# Patient Record
Sex: FEMALE | Race: WHITE | Employment: OTHER | URBAN - METROPOLITAN AREA
[De-identification: names, ages, dates, MRNs, and addresses within clinical notes are randomized per-mention and may not be internally consistent; named-entity substitution may affect disease eponyms.]

---

## 2017-01-03 RX ORDER — CITALOPRAM 10 MG/1
TABLET ORAL
Qty: 30 TABLET | Refills: 2 | Status: SHIPPED | OUTPATIENT
Start: 2017-01-03 | End: 2017-01-09 | Stop reason: SDUPTHER

## 2017-01-09 ENCOUNTER — OFFICE VISIT (OUTPATIENT)
Dept: FAMILY MEDICINE CLINIC | Age: 82
End: 2017-01-09

## 2017-01-09 VITALS
WEIGHT: 134 LBS | SYSTOLIC BLOOD PRESSURE: 115 MMHG | RESPIRATION RATE: 16 BRPM | TEMPERATURE: 98.1 F | HEART RATE: 91 BPM | BODY MASS INDEX: 24.51 KG/M2 | DIASTOLIC BLOOD PRESSURE: 70 MMHG

## 2017-01-09 DIAGNOSIS — E78.2 MIXED HYPERLIPIDEMIA: ICD-10-CM

## 2017-01-09 DIAGNOSIS — I10 ESSENTIAL HYPERTENSION: Primary | ICD-10-CM

## 2017-01-09 DIAGNOSIS — F01.518 VASCULAR DEMENTIA WITH BEHAVIOR DISTURBANCE: ICD-10-CM

## 2017-01-09 DIAGNOSIS — I10 ESSENTIAL HYPERTENSION: ICD-10-CM

## 2017-01-09 LAB
A/G RATIO: 1.7 (ref 1.1–2.2)
ALBUMIN SERPL-MCNC: 4.2 G/DL (ref 3.4–5)
ALP BLD-CCNC: 63 U/L (ref 40–129)
ALT SERPL-CCNC: 14 U/L (ref 10–40)
ANION GAP SERPL CALCULATED.3IONS-SCNC: 12 MMOL/L (ref 3–16)
AST SERPL-CCNC: 21 U/L (ref 15–37)
BILIRUB SERPL-MCNC: 0.3 MG/DL (ref 0–1)
BUN BLDV-MCNC: 20 MG/DL (ref 7–20)
CALCIUM SERPL-MCNC: 9.7 MG/DL (ref 8.3–10.6)
CHLORIDE BLD-SCNC: 102 MMOL/L (ref 99–110)
CHOLESTEROL, TOTAL: 229 MG/DL (ref 0–199)
CO2: 27 MMOL/L (ref 21–32)
CREAT SERPL-MCNC: 0.8 MG/DL (ref 0.6–1.2)
GFR AFRICAN AMERICAN: >60
GFR NON-AFRICAN AMERICAN: >60
GLOBULIN: 2.5 G/DL
GLUCOSE BLD-MCNC: 100 MG/DL (ref 70–99)
HDLC SERPL-MCNC: 62 MG/DL (ref 40–60)
LDL CHOLESTEROL CALCULATED: 141 MG/DL
POTASSIUM SERPL-SCNC: 4.7 MMOL/L (ref 3.5–5.1)
SODIUM BLD-SCNC: 141 MMOL/L (ref 136–145)
TOTAL PROTEIN: 6.7 G/DL (ref 6.4–8.2)
TRIGL SERPL-MCNC: 129 MG/DL (ref 0–150)
VLDLC SERPL CALC-MCNC: 26 MG/DL

## 2017-01-09 PROCEDURE — 99214 OFFICE O/P EST MOD 30 MIN: CPT | Performed by: FAMILY MEDICINE

## 2017-01-09 RX ORDER — ATENOLOL 25 MG/1
25 TABLET ORAL DAILY
Qty: 30 TABLET | Refills: 6 | Status: SHIPPED | OUTPATIENT
Start: 2017-01-09 | End: 2017-08-17 | Stop reason: SDUPTHER

## 2017-01-09 RX ORDER — CITALOPRAM 10 MG/1
TABLET ORAL
Qty: 30 TABLET | Refills: 5 | Status: SHIPPED | OUTPATIENT
Start: 2017-01-09 | End: 2017-07-03 | Stop reason: SDUPTHER

## 2017-01-09 RX ORDER — SIMVASTATIN 40 MG
TABLET ORAL
Qty: 30 TABLET | Refills: 5 | Status: SHIPPED | OUTPATIENT
Start: 2017-01-09 | End: 2017-07-03 | Stop reason: SDUPTHER

## 2017-01-09 RX ORDER — OMEPRAZOLE 20 MG/1
CAPSULE, DELAYED RELEASE ORAL
Qty: 30 CAPSULE | Refills: 6 | Status: SHIPPED | OUTPATIENT
Start: 2017-01-09 | End: 2017-08-17 | Stop reason: SDUPTHER

## 2017-01-09 RX ORDER — AMLODIPINE BESYLATE 5 MG/1
TABLET ORAL
Qty: 30 TABLET | Refills: 6 | Status: SHIPPED | OUTPATIENT
Start: 2017-01-09 | End: 2017-08-17 | Stop reason: SDUPTHER

## 2017-01-09 RX ORDER — VALSARTAN 160 MG/1
TABLET ORAL
Qty: 30 TABLET | Refills: 5 | Status: SHIPPED | OUTPATIENT
Start: 2017-01-09 | End: 2017-07-03 | Stop reason: SDUPTHER

## 2017-01-09 ASSESSMENT — ENCOUNTER SYMPTOMS
CONSTIPATION: 1
WHEEZING: 0
COUGH: 0
SHORTNESS OF BREATH: 0

## 2017-01-09 ASSESSMENT — PATIENT HEALTH QUESTIONNAIRE - PHQ9
SUM OF ALL RESPONSES TO PHQ QUESTIONS 1-9: 1
2. FEELING DOWN, DEPRESSED OR HOPELESS: 1
1. LITTLE INTEREST OR PLEASURE IN DOING THINGS: 0
SUM OF ALL RESPONSES TO PHQ9 QUESTIONS 1 & 2: 1

## 2017-01-10 ASSESSMENT — ENCOUNTER SYMPTOMS
BACK PAIN: 0
TROUBLE SWALLOWING: 0

## 2017-02-01 ENCOUNTER — TELEPHONE (OUTPATIENT)
Dept: FAMILY MEDICINE CLINIC | Age: 82
End: 2017-02-01

## 2017-02-01 RX ORDER — MEMANTINE HYDROCHLORIDE 28 MG/1
28 CAPSULE, EXTENDED RELEASE ORAL DAILY
Qty: 30 CAPSULE | Refills: 1 | Status: SHIPPED | OUTPATIENT
Start: 2017-02-01 | End: 2017-04-07 | Stop reason: SDUPTHER

## 2017-04-17 ENCOUNTER — OFFICE VISIT (OUTPATIENT)
Dept: FAMILY MEDICINE CLINIC | Age: 82
End: 2017-04-17

## 2017-04-17 VITALS
HEART RATE: 69 BPM | OXYGEN SATURATION: 97 % | WEIGHT: 140 LBS | BODY MASS INDEX: 23.9 KG/M2 | HEIGHT: 64 IN | DIASTOLIC BLOOD PRESSURE: 80 MMHG | SYSTOLIC BLOOD PRESSURE: 114 MMHG

## 2017-04-17 DIAGNOSIS — F03.90 DEMENTIA WITHOUT BEHAVIORAL DISTURBANCE, UNSPECIFIED DEMENTIA TYPE: Primary | ICD-10-CM

## 2017-04-17 DIAGNOSIS — L60.0 INGROWN TOENAIL WITHOUT INFECTION: ICD-10-CM

## 2017-04-17 DIAGNOSIS — I10 ESSENTIAL HYPERTENSION: ICD-10-CM

## 2017-04-17 PROCEDURE — 99213 OFFICE O/P EST LOW 20 MIN: CPT | Performed by: FAMILY MEDICINE

## 2017-04-17 ASSESSMENT — ENCOUNTER SYMPTOMS
SHORTNESS OF BREATH: 0
NAUSEA: 0
DIARRHEA: 0
ABDOMINAL PAIN: 0
WHEEZING: 0
COUGH: 0
CONSTIPATION: 1

## 2017-04-26 ENCOUNTER — OFFICE VISIT (OUTPATIENT)
Dept: ORTHOPEDIC SURGERY | Age: 82
End: 2017-04-26

## 2017-04-26 VITALS
HEART RATE: 74 BPM | WEIGHT: 139.99 LBS | SYSTOLIC BLOOD PRESSURE: 139 MMHG | BODY MASS INDEX: 23.9 KG/M2 | DIASTOLIC BLOOD PRESSURE: 68 MMHG | HEIGHT: 64 IN

## 2017-04-26 DIAGNOSIS — B35.1 ONYCHOMYCOSIS: Primary | ICD-10-CM

## 2017-04-26 PROCEDURE — 99202 OFFICE O/P NEW SF 15 MIN: CPT | Performed by: PODIATRIST

## 2017-07-19 ENCOUNTER — OFFICE VISIT (OUTPATIENT)
Dept: FAMILY MEDICINE CLINIC | Age: 82
End: 2017-07-19

## 2017-07-19 VITALS
SYSTOLIC BLOOD PRESSURE: 115 MMHG | BODY MASS INDEX: 24.48 KG/M2 | HEIGHT: 64 IN | RESPIRATION RATE: 16 BRPM | HEART RATE: 80 BPM | TEMPERATURE: 97.8 F | WEIGHT: 143.4 LBS | DIASTOLIC BLOOD PRESSURE: 71 MMHG | OXYGEN SATURATION: 97 %

## 2017-07-19 DIAGNOSIS — I10 ESSENTIAL HYPERTENSION: ICD-10-CM

## 2017-07-19 DIAGNOSIS — R05.9 COUGH: Primary | ICD-10-CM

## 2017-07-19 PROCEDURE — 99214 OFFICE O/P EST MOD 30 MIN: CPT | Performed by: NURSE PRACTITIONER

## 2017-07-19 RX ORDER — FUROSEMIDE 20 MG/1
20 TABLET ORAL DAILY
Qty: 10 TABLET | Refills: 0 | Status: SHIPPED | OUTPATIENT
Start: 2017-07-19 | End: 2018-04-19 | Stop reason: ALTCHOICE

## 2017-07-19 RX ORDER — AZITHROMYCIN 250 MG/1
TABLET, FILM COATED ORAL
Qty: 1 PACKET | Refills: 0 | Status: SHIPPED | OUTPATIENT
Start: 2017-07-19 | End: 2017-07-29

## 2017-07-19 RX ORDER — FLUTICASONE PROPIONATE 50 MCG
2 SPRAY, SUSPENSION (ML) NASAL DAILY
Qty: 1 BOTTLE | Refills: 0 | Status: SHIPPED | OUTPATIENT
Start: 2017-07-19 | End: 2017-08-17 | Stop reason: SDUPTHER

## 2017-07-19 RX ORDER — POTASSIUM CHLORIDE 20 MEQ/1
20 TABLET, EXTENDED RELEASE ORAL DAILY
Qty: 10 TABLET | Refills: 0 | Status: SHIPPED | OUTPATIENT
Start: 2017-07-19 | End: 2018-04-19 | Stop reason: ALTCHOICE

## 2017-07-19 RX ORDER — HYDROCHLOROTHIAZIDE 25 MG/1
25 TABLET ORAL DAILY
Qty: 3 TABLET | Refills: 0 | Status: CANCELLED | OUTPATIENT
Start: 2017-07-19

## 2017-07-19 ASSESSMENT — ENCOUNTER SYMPTOMS
EYE REDNESS: 0
DIARRHEA: 0
CONSTIPATION: 0
WHEEZING: 0
TROUBLE SWALLOWING: 0
SHORTNESS OF BREATH: 1
COUGH: 1
NAUSEA: 0
EYE ITCHING: 0
CHEST TIGHTNESS: 1
ABDOMINAL PAIN: 0
ABDOMINAL DISTENTION: 1
SINUS PRESSURE: 0
RHINORRHEA: 1
COLOR CHANGE: 0
SORE THROAT: 0

## 2017-08-16 ENCOUNTER — OFFICE VISIT (OUTPATIENT)
Dept: FAMILY MEDICINE CLINIC | Age: 82
End: 2017-08-16

## 2017-08-16 VITALS
RESPIRATION RATE: 16 BRPM | SYSTOLIC BLOOD PRESSURE: 156 MMHG | HEART RATE: 78 BPM | DIASTOLIC BLOOD PRESSURE: 82 MMHG | BODY MASS INDEX: 24.55 KG/M2 | TEMPERATURE: 97.9 F | WEIGHT: 143 LBS

## 2017-08-16 DIAGNOSIS — R05.9 COUGH: Primary | ICD-10-CM

## 2017-08-16 DIAGNOSIS — I10 ESSENTIAL HYPERTENSION: ICD-10-CM

## 2017-08-16 PROCEDURE — 99213 OFFICE O/P EST LOW 20 MIN: CPT | Performed by: FAMILY MEDICINE

## 2017-08-16 RX ORDER — MONTELUKAST SODIUM 10 MG/1
10 TABLET ORAL NIGHTLY
Qty: 30 TABLET | Refills: 3 | Status: SHIPPED | OUTPATIENT
Start: 2017-08-16 | End: 2017-12-19 | Stop reason: SDUPTHER

## 2017-08-16 RX ORDER — DEXTROMETHORPHAN POLISTIREX 30 MG/5ML
60 SUSPENSION ORAL 2 TIMES DAILY PRN
Qty: 148 ML | Refills: 2 | Status: SHIPPED | OUTPATIENT
Start: 2017-08-16 | End: 2018-04-19 | Stop reason: ALTCHOICE

## 2017-08-16 ASSESSMENT — ENCOUNTER SYMPTOMS
CONSTIPATION: 0
DIARRHEA: 0
WHEEZING: 0
TROUBLE SWALLOWING: 0
SORE THROAT: 0
SHORTNESS OF BREATH: 0
COUGH: 1
VOICE CHANGE: 0
ABDOMINAL PAIN: 0

## 2017-09-18 ENCOUNTER — TELEPHONE (OUTPATIENT)
Dept: FAMILY MEDICINE CLINIC | Age: 82
End: 2017-09-18

## 2017-09-19 ENCOUNTER — OFFICE VISIT (OUTPATIENT)
Dept: FAMILY MEDICINE CLINIC | Age: 82
End: 2017-09-19

## 2017-09-19 VITALS
HEIGHT: 64 IN | BODY MASS INDEX: 24.59 KG/M2 | WEIGHT: 144 LBS | OXYGEN SATURATION: 97 % | DIASTOLIC BLOOD PRESSURE: 64 MMHG | HEART RATE: 73 BPM | SYSTOLIC BLOOD PRESSURE: 120 MMHG

## 2017-09-19 DIAGNOSIS — R13.10 PROBLEMS WITH SWALLOWING: ICD-10-CM

## 2017-09-19 DIAGNOSIS — T17.308A CHOKING, INITIAL ENCOUNTER: Primary | ICD-10-CM

## 2017-09-19 PROCEDURE — G0009 ADMIN PNEUMOCOCCAL VACCINE: HCPCS | Performed by: NURSE PRACTITIONER

## 2017-09-19 PROCEDURE — 90714 TD VACC NO PRESV 7 YRS+ IM: CPT | Performed by: NURSE PRACTITIONER

## 2017-09-19 PROCEDURE — 90732 PPSV23 VACC 2 YRS+ SUBQ/IM: CPT | Performed by: NURSE PRACTITIONER

## 2017-09-19 PROCEDURE — 99214 OFFICE O/P EST MOD 30 MIN: CPT | Performed by: NURSE PRACTITIONER

## 2017-09-19 PROCEDURE — 90471 IMMUNIZATION ADMIN: CPT | Performed by: NURSE PRACTITIONER

## 2017-09-19 ASSESSMENT — ENCOUNTER SYMPTOMS
SHORTNESS OF BREATH: 0
RHINORRHEA: 1
COUGH: 1
SORE THROAT: 0

## 2017-09-19 NOTE — PATIENT INSTRUCTIONS
Ideally your blood pressure goal should be below 130/80. You can learn more about blood pressure and ways to incorporate a healthy lifestlye to help treat it from the American Heart Association website: www.heart. org under the  Getting Healthy link, and the [address] website: www.nhlbi.nih.gov/hbp    A for Activity  It helps your blood pressure when you exercise regularly. You should try to exercise at least 3 times a week for 20 minutes at a pace that you can comfortably carry on a conversation without being out of breath. B for BMI  The Body Mass Index should be below 30. This is your weight and height measurement. A BMI below 30 is preferred to help lower the risk of Type 2 diabetes, high cholesterol, heart disease, high blood pressure, sleep apnea, gallbladder disease and strokes. C for Control your Salt Intake  Avoid adding salt to your food. Avoid processed food, fast food, and junk food which all has high levels of sodium added. Read labels and get no more than 1500-2300mg of sodium a day. How confident do you feel that you will be able to begin working on your goals before next visit? :     *Some confidence       Please record your blood pressure once every two weeks below:  Date  Blood pressure    Thank you for enrolling in [address] [facility]. Please follow the instructions below to securely access your online medical record. Geekatoo allows you to send messages to your doctor, view your test results, renew your prescriptions, schedule appointments, and more. How Do I Sign Up? 1. In your Internet browser, go to [email or web address]  2. Click on the Sign Up Now link in the Sign In box. You will see the New Member Sign Up page. 3. Enter your Geekatoo Access Code exactly as it appears below. You will not need to use this code after youve completed the sign-up process. If you do not sign up before the expiration date, you must request a new code.   MyChart Access Code: Activation code not generated  Current Medallion Learning Status: Active    4. Enter your Social Security Number (xxx-[id number]) and Date of Birth (mm/dd/yyyy) as indicated and click Submit. You will be taken to the next sign-up page. 5. Create a [employer] ID. This will be your [employer] login ID and cannot be changed, so think of one that is secure and easy to remember. 6. Create a [employer] password. You can change your password at any time. 7. Enter your Password Reset Question and Answer. This can be used at a later time if you forget your password. 8. Enter your e-mail address. You will receive e-mail notification when new information is available in [address] 9. Click Sign Up. You can now view your medical record. Additional Information  If you have questions, please contact your physician practice where you receive care. Remember, [employer] is NOT to be used for urgent needs. For medical emergencies, dial 911. Thank you for enrolling in [address] Please follow the instructions below to securely access your online medical record. [employer] allows you to send messages to your doctor, view your test results, renew your prescriptions, schedule appointments, and more. How Do I Sign Up?  10. In your Internet browser, go to [email or web address]  11. Click on the Sign Up Now link in the Sign In box. You will see the New Member Sign Up page. 15. Enter your [employer] Access Code exactly as it appears below. You will not need to use this code after youve completed the sign-up process. If you do not sign up before the expiration date, you must request a new code. MyChart Access Code: Activation code not generated  Current Med[employer]ion Learning Status: Active    13. Enter your Social Security Number (xxx-[id number]) and Date of Birth (mm/dd/yyyy) as indicated and click Submit. You will be taken to the next sign-up page. 14. Create a [employer] ID.  This will be your [employer] login ID and cannot be changed, so think of one that is secure and easy to remember. 13. Create a [employer] Network Services password. You can change your password at any time. 12. Enter your Password Reset Question and Answer. This can be used at a later time if you forget your password. 16. Enter your e-mail address. You will receive e-mail notification when new information is available in [address] 18. Click Sign Up. You can now view your medical record. Additional Information  If you have questions, please contact your physician practice where you receive care. Remember, [employer] is NOT to be used for urgent needs. For medical emergencies, dial 911.

## 2017-09-19 NOTE — MR AVS SNAPSHOT
After Visit Summary             Jeff Roman   2017 2:00 PM   Office Visit    Description:  Female : 1934   Provider:  Leta Ogden CNP   Department:  [facility] [address]              Your Follow-Up and Future Appointments         Below is a list of your follow-up and future appointments. This may not be a complete list as you may have made appointments directly with providers that we are not aware of or your providers may have made some for you. Please call your providers to confirm appointments. It is important to keep your appointments. Please bring your current insurance card, photo ID, co-pay, and all medication bottles to your appointment. If self-pay, payment is expected at the time of service. Your To-Do List     Future Orders Complete By Amaury Arechiga Modified Sarabjit Stover Video [[id number] Custom]  2017    Follow-Up    Return if symptoms worsen or fail to improve. Information from Your Visit        Department     Name Address Phone Fax    [address] [address] [address] [phone]      You Were Seen for:         Comments    Choking, initial encounter   [[id number]]         Vital Signs     Blood Pressure Pulse Height Weight Oxygen Saturation Breastfeeding?    120/64 (Site: Right Arm, Position: Sitting, Cuff Size: Small Adult) 73 5' 4\" (1.626 m) 144 lb (65.3 kg) 97% No    Body Mass Index Smoking Status                24.72 kg/m2 Never Smoker          Instructions    Ideally your blood pressure goal should be below 130/80. You can learn more about blood pressure and ways to incorporate a healthy lifestlye to help treat it from the American Heart Association website: www.heart. org under the  Getting Healthy link, and the [address] website: www.nhlbi.nih.gov/hbp    A for Activity  It helps your blood pressure when you exercise regularly. You should try to exercise at least 3 times a week for 20 minutes at a pace that you can comfortably carry on a conversation without being out of breath. B for BMI  The Body Mass Index should be below 30. This is your weight and height measurement. A BMI below 30 is preferred to help lower the risk of Type 2 diabetes, high cholesterol, heart disease, high blood pressure, sleep apnea, gallbladder disease and strokes. C for Control your Salt Intake  Avoid adding salt to your food. Avoid processed food, fast food, and junk food which all has high levels of sodium added. Read labels and get no more than 1500-2300mg of sodium a day. How confident do you feel that you will be able to begin working on your goals before next visit? :     *Some confidence       Please record your blood pressure once every two weeks below:  Date  Blood pressure    Thank you for enrolling in [address] Please follow the instructions below to securely access your online medical record. Mercora allows you to send messages to your doctor, view your test results, renew your prescriptions, schedule appointments, and more. How Do I Sign Up? 1. In your Internet browser, go to [email or web address]  2. Click on the Sign Up Now link in the Sign In box. You will see the New Member Sign Up page. 3. Enter your Mercora Access Code exactly as it appears below. You will not need to use this code after youve completed the sign-up process. If you do not sign up before the expiration date, you must request a new code. Mercora Access Code: Activation code not generated  Current Mercora Status: Active    4. Enter your Social Security Number (xxx-xx-xxxx) and Date of Birth (mm/dd/yyyy) as indicated and click Submit. You will be taken to the next sign-up page. 5. Create a Mercora ID. This will be your Mercora login ID and cannot be changed, so think of one that is secure and easy to remember. 6. Create a Back9 Network password. You can change your password at any time. 7. Enter your Password Reset Question and Answer. This can be used at a later time if you forget your password. 8. Enter your e-mail address. You will receive e-mail notification when new information is available in [address] 9. Click Sign Up. You can now view your medical record. Additional Information  If you have questions, please contact your physician practice where you receive care. Remember, Back9 Network is NOT to be used for urgent needs. For medical emergencies, dial 911. Thank you for enrolling in [address] Please follow the instructions below to securely access your online medical record. Back9 Network allows you to send messages to your doctor, view your test results, renew your prescriptions, schedule appointments, and more. How Do I Sign Up?  10. In your Internet browser, go to [email or web address]  11. Click on the Sign Up Now link in the Sign In box. You will see the New Member Sign Up page. 15. Enter your Back9 Network Access Code exactly as it appears below. You will not need to use this code after youve completed the sign-up process. If you do not sign up before the expiration date, you must request a new code. Back9 Network Access Code: Activation code not generated  Current Back9 Network Status: Active    13. Enter your Social Security Number (xxx-xx-xxxx) and Date of Birth (mm/dd/yyyy) as indicated and click Submit. You will be taken to the next sign-up page. 14. Create a Menigat ID. This will be your Back9 Network login ID and cannot be changed, so think of one that is secure and easy to remember. 13. Create a Back9 Network password. You can change your password at any time. 12. Enter your Password Reset Question and Answer. This can be used at a later time if you forget your password. 16. Enter your e-mail address. You will receive e-mail notification when new information is available in [address] 18. Click Sign Up. You can now view your medical record. Additional Information  If you have questions, please contact your physician practice where you receive care. Remember, MyChart is NOT to be used for urgent needs. For medical emergencies, dial 911. Medications and Orders      Your Current Medications Are              fluticasone (FLONASE) 50 MCG/ACT nasal spray 2 sprays by Nasal route daily    atenolol (TENORMIN) 25 MG tablet Take 1 tablet by mouth daily    omeprazole (PRILOSEC) 20 MG delayed release capsule TAKE 1 CAPSULE BY MOUTH DAILY FOR STOMACH ACID    amLODIPine (NORVASC) 5 MG tablet TAKE 1 TABLET BY MOUTH DAILY. montelukast (SINGULAIR) 10 MG tablet Take 1 tablet by mouth nightly For sinus / chest    dextromethorphan (DELSYM) 30 MG/5ML extended release liquid Take 10 mLs by mouth 2 times daily as needed for Cough    furosemide (LASIX) 20 MG tablet Take 1 tablet by mouth daily Every day x 3 days then every every other day for 3 more doses. potassium chloride (KLOR-CON M) 20 MEQ extended release tablet Take 1 tablet by mouth daily    albuterol sulfate HFA (VENTOLIN HFA) 108 (90 Base) MCG/ACT inhaler Inhale 2 puffs into the lungs every 6 hours as needed for Wheezing    NAMENDA XR 28 MG CP24 extended release capsule TAKE 1 CAPSULE BY MOUTH DAILY    citalopram (CELEXA) 10 MG tablet TAKE 1 TABLET BY MOUTH DAILY    simvastatin (ZOCOR) 40 MG tablet TAKE ONE TABLET BY MOUTH AT BEDTIME    valsartan (DIOVAN) 160 MG tablet TAKE 1 TABLET BY MOUTH DAILY    Multiple Vitamins-Minerals (VITABASIC COMPLETE PO) Take by mouth    Simethicone (GAS RELIEF 125 MAX ST PO) Take by mouth daily    MELATONIN PO Take by mouth as needed    NITROSTAT 0.4 MG SL tablet PLACE 1 TABLET UNDER TONGUE EVERY 5 MINUTES AS NEEDED FOR CHEST PAIN    calcium carbonate (OSCAL) 500 MG TABS tablet Take 1,000 mg by mouth 2 times daily. Cholecalciferol (VITAMIN D3) 1000 UNITS CAPS Take 1 capsule by mouth daily. Our records indicate that you have an active Tribe account. You can view your After Visit Summary by going to [email or web address] and logging in with your Tribe username and password. If you don't have a Tribe username and password but a parent or guardian has access to your record, the parent or guardian should login with their own Tribe username and password and access your record to view the After Visit Summary. Additional Information  If you have questions, please contact the physician practice where you receive care. Remember, Tribe is NOT to be used for urgent needs. For medical emergencies, dial 911. For questions regarding your Tribe account call [phone]. If you have a clinical question, please call your doctor's office.

## 2017-09-19 NOTE — PROGRESS NOTES
The medications were discussed with the patient and the physician. Prescription and over the counter medicines reviewed. Pt is taking medication as prescribed  any side effects or barriers such as difficulty in taking the medication reveiwed. The purpose, side effects, dose of medication of new medications were explained to the patient and questions answered by the physician. The patients understands the information concerning medication. Individual treatment plan developed:Self-management plan and goals developed and discussed. Resources given. See patient instructions. Medication treatment plan developed by the physician. See orders. Healthy behavior discussed: Preventative behaviors discussed regarding healthy diet, exercise, and not smoking. Pt already has tool to record self care of BS or high blood pressure results, regular paper ok. Self management confidence of patient being able to put goal into action assessed: medium confidence. Barriers to treatment evaluated:none unless otherwise noted in the HPI.

## 2017-09-19 NOTE — PROGRESS NOTES
Subjective:      Chief Complaint   Patient presents with    Cough     coughs up phlem X2 months        Patient ID: Peyton Dee is a 80 y.o. female who presents for A cough which has been going on for over 6 months. She has been to the ER with no results other than a normal chest x-ray. She has seen several other doctors we have treated her for upper respiratory tract symptoms still she has a remaining cough. In questioning her further it seems to be that patient has what sounds like a reaction to a postnasal drip. She has had several experiences where she has choked and coughed on food and oral liquid. The daughter tells me that one coffee episode was so bad that she didn't think her mom is going to make it. Patient is afebrile cough is nonproductive saliva is clear, nodes are noninflamed    Cough   This is a new problem. The current episode started more than 1 year ago. The problem has been gradually worsening. The problem occurs every few minutes. The cough is non-productive. Associated symptoms include nasal congestion, postnasal drip and rhinorrhea. Pertinent negatives include no chest pain, chills, ear congestion, fever, headaches, myalgias, sore throat or shortness of breath. The symptoms are aggravated by exercise. She has tried steroid inhaler for the symptoms. The treatment provided mild relief. Her past medical history is significant for COPD. There is no history of asthma, bronchitis, environmental allergies or pneumonia. No family history on file. Social History     Social History    Marital status:      Spouse name: N/A    Number of children: N/A    Years of education: N/A     Occupational History    Not on file.      Social History Main Topics    Smoking status: Never Smoker    Smokeless tobacco: Never Used    Alcohol use No    Drug use: No    Sexual activity: Not Currently     Other Topics Concern    Not on file     Social History Narrative       Current Outpatient studies to patient's daughter. Both stated they understood and would call [facility] in the morning.   Follow-up in 2 weeks or sooner if needed  If this does not prove effective she may need an ENT consult

## 2017-10-04 ENCOUNTER — HOSPITAL ENCOUNTER (OUTPATIENT)
Dept: SPEECH THERAPY | Age: 82
Discharge: HOME | End: 2017-10-04
Attending: NURSE PRACTITIONER | Admitting: NURSE PRACTITIONER

## 2017-10-04 DIAGNOSIS — T17.308A CHOKING, INITIAL ENCOUNTER: ICD-10-CM

## 2017-10-04 NOTE — PROCEDURES
INSTRUMENTAL SWALLOW REPORT  MODIFIED BARIUM SWALLOW    NAME: Mauricio Bradley   : 1934  MRN: [id number]       Date of Eval: 10/4/2017     Ordering Physician: Dr. Juan Michelle  Radiologist: Dr. Cory Kevin  Referring Diagnosis(es): Referring Diagnosis: L63.144M; Choking    Past Medical History:  has a past medical history of Anxiety; CAD (coronary artery disease); Dementia; Hyperlipidemia; Hypertension; and Memory loss. Past Surgical History:  has a past surgical history that includes angioplasty ( ?) and skin biopsy (). Current Diet Solid Consistency: Regular  Current Diet Liquid Consistency: Thin    Date of Prior Study: N/A  Type of Study: Initial MBS  Results of Prior Study: N/A    Pain   Patient Currently in Pain: No    Recent CXR/CT of Chest: N/A    Patient Complaints/Reason for Referral:  Mauricio Bradley was referred for a MBS to assess the efficiency of his/her swallow function, rule out aspiration, and to make recommendations regarding safe dietary consistencies, effective compensatory strategies, and safe eating environment. Patient complaints: Chronic cough for \"last 6 months\"; pt coughs throughout the day, not just when eating / drinking    Recommended Diet:  Solid consistency: Regular  Liquid consistency: Thin (Avoid straws, small cup sips only)  Liquid administration via: Spoon, Cup  Medication administration: PO    Impressions:  Treatment Dx and ICD 10: T17.308A; Choking  Pt presents with mild oropharyngeal dysphagia  · Pt's oral deficits characterized by weak lingual manipulation and reduced A-P bolus transit with regular solid trials, resulting in mildly prolonged mastication. Pt also demonstrated reduced bolus control with nectar-thick and thin liquid trials, resulting in premature bolus loss to the pharynx.   · Pt's pharyngeal deficits characterized by significantly delayed swallow initiation, reduced laryngeal elevation, and reduced anterior laryngeal movement, resulting in Thin cup, Thin straw  · Deep Penetration Before: Thin cup  · Complete Retrieval (deep): Thin cup  · No Cough Reflex: Thin cup, Thin straw  · Pharyngeal Residue - Valleculae: Nectar cup (Minimal)    Esophageal Phase  Appears WFL when viewed at the cervical level throughout the duration of the study      Patient Position: Lateral and Patient Degrees: [facility]  Consistencies Trialled: Soft solid, Reg solid, Puree, Thin teaspoon, Thin cup, Thin straw, Nectar cup, Nectar straw    Behavior/Cognition/Vision/Hearing:  Behavior/Cognition: Cooperative, Pleasant mood, Alert, Requires cueing  Vision: Impaired  Vision Exceptions: Wears glasses for reading  Hearing: Exceptions to [facility]  Hearing Exceptions: Hard of hearing/hearing concerns    Dysphagia Outcome Severity Scale: Level 5: Mild dysphagia- Distant supervision. May need one diet consistency restricted  Penetration-Aspiration Scale (PAS): 2 - Material enters the airway, remains above the vocal folds, and is ejected from the airway    Safe Swallow Protocol:  Supervision: Distant  Compensatory Swallowing Strategies: Small bites/sips, Remain upright for 30-45 minutes after meals, Upright as possible for all oral intake, No straws, Eat/Feed slowly, Alternate solids and liquids    Recommendations/Treatment  Requires SLP Intervention: No   D/C Recommendations: Home independently    Recommended Exercises:    Therapeutic Interventions: Patient/Family education    Education: Images and recommendations were reviewed with pt and pt's daughter-in-law following this exam.   Patient Education: Pt educated on [facility] procedure, nature of oropharyngeal deficits, assessment results and recommendations.   Patient Education Response: Verbalizes understanding, Needs reinforcement    Prognosis for safe diet advancement:  Prognosis for safe diet advancement: good    Goals: Further ST not indicated at this time      G-Code:  SLP G-Codes  Functional Limitations:

## 2017-10-04 NOTE — LETTER
· Reduced Bolus Control: Nectar cup, Nectar straw, Thin teaspoon, Thin cup, Thin straw  · Piecemeal deglutition: Nectar straw, Thin straw, Thin cup, Nectar cup  · Premature Bolus Loss to Pharynx: Soft solid, Puree, Nectar cup, Nectar straw, Thin teaspoon, Thin cup, Thin straw     Pharyngeal Phase  Impaired  Pharyngeal Phase - Major Contributing Deficits  · Delayed Swallow Initiation: All  · Premature Spillage to Valleculae: Soft solid, Puree  · Premature Spillage to Pyriform: Nectar straw, Thin teaspoon, Thin cup, Thin straw, Nectar cup  · Reduced Laryngeal Elevation: All  · Reduced Anterior Laryngeal Movement: All  · Reduced Thyrohyoid Approximation: All  · Reduced Airway/laryngeal Closure: Thin teaspoon, Thin cup, Thin straw  · Shallow Penetration Before: Thin cup, Thin straw  · Complete Self-clearing (shallow): Thin cup, Thin straw  · Deep Penetration Before: Thin cup  · Complete Retrieval (deep): Thin cup  · No Cough Reflex: Thin cup, Thin straw  · Pharyngeal Residue - Valleculae: Nectar cup (Minimal)     Esophageal Phase  Appears WFL when viewed at the cervical level throughout the duration of the study        Patient Position: Lateral and Patient Degrees: Pt seated upright in [facility] chair  Consistencies Trialled: Soft solid, Reg solid, Puree, Thin teaspoon, Thin cup, Thin straw, Nectar cup, Nectar straw     Behavior/Cognition/Vision/Hearing:  Behavior/Cognition: Cooperative, Pleasant mood, Alert, Requires cueing  Vision: Impaired  Vision Exceptions: Wears glasses for reading  Hearing: Exceptions to [facility]  Hearing Exceptions: Hard of hearing/hearing concerns     Dysphagia Outcome Severity Scale: Level 5: Mild dysphagia- Distant supervision.  May need one diet consistency restricted  Penetration-Aspiration Scale (PAS): 2 - Material enters the airway, remains above the vocal folds, and is ejected from the airway     Safe Swallow Protocol:  Supervision: Distant Compensatory Swallowing Strategies: Small bites/sips, Remain upright for 30-45 minutes after meals, Upright as possible for all oral intake, No straws, Eat/Feed slowly, Alternate solids and liquids     Recommendations/Treatment  Requires SLP Intervention: No   D/C Recommendations: Home independently     Recommended Exercises:    Therapeutic Interventions: Patient/Family education     Education: Images and recommendations were reviewed with pt and pt's daughter-in-law following this exam.   Patient Education: Pt educated on MBSS procedure, nature of oropharyngeal deficits, assessment results and recommendations. Patient Education Response: Verbalizes understanding, Needs reinforcement     Prognosis for safe diet advancement:  Prognosis for safe diet advancement: good     Goals: Further ST not indicated at this time       G-Code:  SLP G-Codes  Functional Limitations: Swallowing  Swallow Current Status (): At least 1 percent but less than 20 percent impaired, limited or restricted  Swallow Goal Status (): At least 1 percent but less than 20 percent impaired, limited or restricted  Swallow Discharge Status ():  At least 1 percent but less than 20 percent impaired, limited or restricted     Therapy Time:    Individual Concurrent Group Co-treatment   Time In 0940         Time Out 1005         Minutes 25             25 minutes; MBSS procedure and dysphagia tx     KD Zapata  Speech-language pathologist  [id number]

## 2017-10-04 NOTE — COMMUNICATION BODY
the pharynx. · Pt's pharyngeal deficits characterized by significantly delayed swallow initiation, reduced laryngeal elevation, and reduced anterior laryngeal movement, resulting in premature bolus loss to the pharynx with both nectar-thick and thin liquid trials. Pt demonstrated reduced airway / laryngeal closure with thin liquid trials (via cup, straw) with episodes of shallow and deep, completely-self clearing penetration before the swallow. No cough reflex noted. A chin tuck maneuver was also trialed with thin liquids, however, this did not eliminate episodes of transient penetration. No aspiration/penetration noted with nectar-thick liquid trials (cup, straw). Minimal-no residue noted in the valleculae post-swallow with nectar-thick liquid trials. · Pt's episodes of penetration with thin liquid trials this date were completely self-clearing. Pt has no hx of PNA at this time. SLP spoke with pt and pt's daughter-in-law about recommendation of thin liquids via small cup sip only, no straws. Both verbalized understanding. · Pt may benefit from ENT consult 2/2 chronic cough?     Oral Preparation / Oral Phase  Impaired  Oral Phase - Major Contributing Deficits  · Poor Mastication: Reg solid  · Weak Lingual Manipulation: Reg solid  · Reduced Posterior Propulsion: Reg solid  · Reduced Bolus Control: Nectar cup, Nectar straw, Thin teaspoon, Thin cup, Thin straw  · Piecemeal deglutition: Nectar straw, Thin straw, Thin cup, Nectar cup  · Premature Bolus Loss to Pharynx: Soft solid, Puree, Nectar cup, Nectar straw, Thin teaspoon, Thin cup, Thin straw     Pharyngeal Phase  Impaired  Pharyngeal Phase - Major Contributing Deficits  · Delayed Swallow Initiation: All  · Premature Spillage to Valleculae: Soft solid, Puree  · Premature Spillage to Pyriform: Nectar straw, Thin teaspoon, Thin cup, Thin straw, Nectar cup  · Reduced Laryngeal Elevation: All  · Reduced Anterior Laryngeal Movement:  All  · Reduced Thyrohyoid Approximation: All  · Reduced Airway/laryngeal Closure: Thin teaspoon, Thin cup, Thin straw  · Shallow Penetration Before: Thin cup, Thin straw  · Complete Self-clearing (shallow): Thin cup, Thin straw  · Deep Penetration Before: Thin cup  · Complete Retrieval (deep): Thin cup  · No Cough Reflex: Thin cup, Thin straw  · Pharyngeal Residue - Valleculae: Nectar cup (Minimal)     Esophageal Phase  Appears WFL when viewed at the cervical level throughout the duration of the study        Patient Position: Lateral and Patient Degrees: Pt seated upright in MBSS chair  Consistencies Trialled: Soft solid, Reg solid, Puree, Thin teaspoon, Thin cup, Thin straw, Nectar cup, Nectar straw     Behavior/Cognition/Vision/Hearing:  Behavior/Cognition: Cooperative, Pleasant mood, Alert, Requires cueing  Vision: Impaired  Vision Exceptions: Wears glasses for reading  Hearing: Exceptions to [facility]  Hearing Exceptions: Hard of hearing/hearing concerns     Dysphagia Outcome Severity Scale: Level 5: Mild dysphagia- Distant supervision. May need one diet consistency restricted  Penetration-Aspiration Scale (PAS): 2 - Material enters the airway, remains above the vocal folds, and is ejected from the airway     Safe Swallow Protocol:  Supervision: Distant  Compensatory Swallowing Strategies: Small bites/sips, Remain upright for 30-45 minutes after meals, Upright as possible for all oral intake, No straws, Eat/Feed slowly, Alternate solids and liquids     Recommendations/Treatment  Requires SLP Intervention: No   D/C Recommendations: Home independently     Recommended Exercises:    Therapeutic Interventions: Patient/Family education     Education: Images and recommendations were reviewed with pt and pt's daughter-in-law following this exam.   Patient Education: Pt educated on MBSS procedure, nature of oropharyngeal deficits, assessment results and recommendations.   Patient Education Response: Verbalizes understanding, Needs reinforcement     Prognosis for safe diet advancement:  Prognosis for safe diet advancement: good     Goals: Further ST not indicated at this time       G-Code:  SLP G-Codes  Functional Limitations: Swallowing  Swallow Current Status (): At least 1 percent but less than 20 percent impaired, limited or restricted  Swallow Goal Status (): At least 1 percent but less than 20 percent impaired, limited or restricted  Swallow Discharge Status ():  At least 1 percent but less than 20 percent impaired, limited or restricted     Therapy Time:    Individual Concurrent Group Co-treatment   Time In 0940         Time Out 1005         Minutes 25             25 minutes; MBSS procedure and dysphagia tx     Baron Sathish M.S. [id number] [facility]  Speech-language pathologist  [phone]

## 2017-11-02 ENCOUNTER — TELEPHONE (OUTPATIENT)
Dept: FAMILY MEDICINE CLINIC | Age: 82
End: 2017-11-02

## 2017-11-02 NOTE — TELEPHONE ENCOUNTER
Pt's daughter in law called and stated she doesn't believe the Ella Kussmaul is doing anything for the patient. She is wanting to take her off of it and wants to know if there will be side effects. I advised her not to take the pt off of any medication without consulting her doctor first. She asked me to send a message back and a telephone sarah to be made to her.

## 2017-11-02 NOTE — TELEPHONE ENCOUNTER
Tell the daughter that the Jackeline Kutachomaul can be stopped. There should not be any withdrawal. If her condition worsens, it may mean that the Namenda was doing some good. Ask her if she wants me to refer them to a neurologist for consideration of what other treatment may be helpful.

## 2017-11-27 ENCOUNTER — TELEPHONE (OUTPATIENT)
Dept: FAMILY MEDICINE CLINIC | Age: 82
End: 2017-11-27

## 2017-11-27 DIAGNOSIS — F03.90 DEMENTIA WITHOUT BEHAVIORAL DISTURBANCE, UNSPECIFIED DEMENTIA TYPE: Primary | ICD-10-CM

## 2017-11-27 RX ORDER — MEMANTINE HYDROCHLORIDE 28 MG/1
CAPSULE, EXTENDED RELEASE ORAL
Qty: 30 CAPSULE | Refills: 5 | Status: SHIPPED | OUTPATIENT
Start: 2017-11-27 | End: 2018-05-09 | Stop reason: SDUPTHER

## 2017-11-27 NOTE — TELEPHONE ENCOUNTER
Spoke to Cassidy Cross, daughter-in-law. Seems worse as far his nervousness and behavior since stopping Namenda. Family was not sure was effective and it is somewhat pricey. Since off of the medicine have noticed a worsening. Several years ago was seen by Dr. Sean White, in consultation. Referral made to him and family given contact information for consultation. Resume Namenda in the meantime.

## 2017-11-27 NOTE — TELEPHONE ENCOUNTER
Pt daughter in law called to get a referral for a neurologist. They have noticed some changes since stopping the Namenda. Please advise.      See message from 11/2/17

## 2017-12-15 ENCOUNTER — INITIAL CONSULT (OUTPATIENT)
Dept: NEUROLOGY | Age: 82
End: 2017-12-15

## 2017-12-15 VITALS
OXYGEN SATURATION: 97 % | SYSTOLIC BLOOD PRESSURE: 107 MMHG | WEIGHT: 141 LBS | DIASTOLIC BLOOD PRESSURE: 51 MMHG | HEIGHT: 63 IN | HEART RATE: 68 BPM | BODY MASS INDEX: 24.98 KG/M2

## 2017-12-15 DIAGNOSIS — F02.80 DEMENTIA DUE TO ALZHEIMER'S DISEASE (HCC): Primary | ICD-10-CM

## 2017-12-15 DIAGNOSIS — F34.1 DYSTHYMIA: ICD-10-CM

## 2017-12-15 DIAGNOSIS — E78.5 DYSLIPIDEMIA: ICD-10-CM

## 2017-12-15 DIAGNOSIS — F51.01 PRIMARY INSOMNIA: ICD-10-CM

## 2017-12-15 DIAGNOSIS — I10 HTN (HYPERTENSION), BENIGN: ICD-10-CM

## 2017-12-15 DIAGNOSIS — G30.9 DEMENTIA DUE TO ALZHEIMER'S DISEASE (HCC): Primary | ICD-10-CM

## 2017-12-15 PROCEDURE — 99214 OFFICE O/P EST MOD 30 MIN: CPT | Performed by: PSYCHIATRY & NEUROLOGY

## 2017-12-15 ASSESSMENT — ENCOUNTER SYMPTOMS
RESPIRATORY NEGATIVE: 1
GASTROINTESTINAL NEGATIVE: 1
BACK PAIN: 1
BLURRED VISION: 0

## 2017-12-15 NOTE — LETTER
Oral MD Barbra    [facility]  [address], [address] [place] [address]    [phone] (Phone)  [phone] (Fax)    Dear Dr Lizbeth Delgado DO    I had the pleasure of seeing your patient Kayy Ford  1934 today. I have attached a detailed summary below:    What is the year   What season are we in   What the date   What day of the week   What is the month   What state are we in   What county are we in   What town/city are we in   What hospital is nearby   What floor are we on   Repeat three words: clock , table , yellow 3 /3  Spell the word WORLD/count backwards from 100 by sevens (82,15,77,29,98)          What were the three words you repeated after me   1 /3  Identify two simple objects; pencil/pen , paper   Repeat after me \"No ifs ands or buts\"   Take the paper in your right hand, fold it in half, and give it back to me 3 /3  Please read this statement, and do what it says (close your eyes)   Write a sentence about anything you want (must contain a noun and a verb)   Please copy this picture (intersecting pentagons)   Total       The patient is a  80y.o. years old female who was referred by Lizbeth Delgado DO  for consultation regarding memory loss. The patient came today with her family. The patient lives with her family     The patient's family noticed gradual worsening of her memory for the last- 2-3 years. She has short term memory loss. She is forgetfull and needs prompt daily. She misplaces items at home and can forget details of recent conversation. She has no problem with her long term memory. Family helps with her ADL and billing. She has legal and medical POA. She goes to [facility] three times weekly. Recent MRI or CT: MRI in  showed chronic small vessel disease. Recent blood testing: nl b12 and TSH in .    Recent stressors: no Other medical comorbid diseases: HTN,HLD, CAD, tremors  Prior medications for dementia: Exelon patch and Namenda XR 28 mg for the last 6 month. history of ETOH:  no  Family history of dementia: yes  Sleep disorder or parasomnia: insomnia but no daily  Head trauma: no  History of strokes: no  Depression: yes  Psychosis or delusions: occasional visual hallucination. Recurrent falling: no  RBD: no    Past Medical History:   Diagnosis Date    Anxiety     CAD (coronary artery disease)     Dementia     alzheimer type    Hyperlipidemia     Hypertension     Memory loss 2015    dementia like loss     Prior to Visit Medications    Medication Sig Taking? Authorizing Provider   memantine ER (NAMENDA XR) 28 MG CP24 extended release capsule TAKE 1 CAPSULE BY MOUTH DAILY Yes Luanne Hodge, DO   valsartan (DIOVAN) 160 MG tablet TAKE 1 TABLET BY MOUTH DAILY Yes Luanne Hodge, DO   simvastatin (ZOCOR) 40 MG tablet TAKE ONE TABLET BY MOUTH AT BEDTIME Yes Luanne Hodge, DO   citalopram (CELEXA) 10 MG tablet TAKE 1 TABLET BY MOUTH DAILY Yes Luanne Hodge, DO   fluticasone (FLONASE) 50 MCG/ACT nasal spray 2 sprays by Nasal route daily Yes Luanne Hodge, DO   atenolol (TENORMIN) 25 MG tablet Take 1 tablet by mouth daily Yes Luanne Hodge DO   omeprazole (PRILOSEC) 20 MG delayed release capsule TAKE 1 CAPSULE BY MOUTH DAILY FOR STOMACH ACID Yes Luanne Hodge, DO   amLODIPine (NORVASC) 5 MG tablet TAKE 1 TABLET BY MOUTH DAILY. Yes Luanne Hodge, DO   montelukast (SINGULAIR) 10 MG tablet Take 1 tablet by mouth nightly For sinus / chest Yes Luanne Hodge, DO   furosemide (LASIX) 20 MG tablet Take 1 tablet by mouth daily Every day x 3 days then every every other day for 3 more doses.  Yes Fredrick Castillo NP   potassium chloride (KLOR-CON M) 20 MEQ extended release tablet Take 1 tablet by mouth daily Yes Fredrick Castillo NP   albuterol sulfate HFA (VENTOLIN HFA) 108 (90 Base) MCG/ACT inhaler Inhale 2 puffs into the lungs every 6 hours as needed for Wheezing Yes Jase Kaba MD   Multiple Vitamins-Minerals (VITABASIC COMPLETE PO) Take by mouth Yes Historical Provider, MD   Simethicone (GAS RELIEF 125 MAX ST PO) Take by mouth daily Yes Historical Provider, MD   MELATONIN PO Take by mouth as needed Yes Historical Provider, MD   NITROSTAT 0.4 MG SL tablet PLACE 1 TABLET UNDER TONGUE EVERY 5 MINUTES AS NEEDED FOR CHEST PAIN Yes Dama Sicard, DO   calcium carbonate (OSCAL) 500 MG TABS tablet Take 1,000 mg by mouth 2 times daily. Yes Historical Provider, MD   Cholecalciferol (VITAMIN D3) 1000 UNITS CAPS Take 1 capsule by mouth daily. Yes Historical Provider, MD   aspirin 81 MG tablet Take 81 mg by mouth daily. Yes Historical Provider, MD   dextromethorphan (DELSYM) 30 MG/5ML extended release liquid Take 10 mLs by mouth 2 times daily as needed for Cough  Dama Sicard, DO     Allergies   Allergen Reactions    Aricept [Donepezil Hcl]      Nausea      Buspar [Buspirone] Other (See Comments)     Makes her delirious    Xanax [Alprazolam] Other (See Comments)     Per family     Social History   Substance Use Topics    Smoking status: Never Smoker    Smokeless tobacco: Never Used    Alcohol use No     History reviewed. No pertinent family history. Past Surgical History:   Procedure Laterality Date    ANGIOPLASTY  2002 ?  SKIN BIOPSY  07/19/217    2 spots removed        Review of Systems   Constitutional: Negative. HENT: Positive for hearing loss. Eyes: Negative for blurred vision. Respiratory: Negative. Cardiovascular: Negative. Gastrointestinal: Negative. Genitourinary: Negative. Musculoskeletal: Positive for back pain and myalgias. Negative for falls. Skin: Negative. Neurological: Positive for dizziness and tremors. Negative for seizures and loss of consciousness. Endo/Heme/Allergies: Negative. Psychiatric/Behavioral: Positive for depression and memory loss.  Negative for suicidal ideas. The patient has insomnia. Exam:   Constitutional:   Vitals:    12/15/17 1046   BP: (!) 107/51   Pulse: 68   SpO2: 97%   Weight: 141 lb (64 kg)   Height: 5' 3\" (1.6 m)         General appearance: well-nourished. Eye: No icterus. No blurring of optic disc. Neck: supple  Cardiovascular: No carotid bruit. Heart: S1, S2         No lower leg edema with good pulsation. Mental Status: MMSE: 22/30  Cranial Nerves:   II: Visual fields: Full to confrontation  III: Pupils: equal, round, reactive to light  III,IV,VI: Extra Ocular Movements are intact. No nystagmus  V: Facial sensation is intact to pin prick and light touch  VII: Facial strength and movements: intact and symmetric smile,cheek puffing and eyebrow elevation  VIII: Hearing: Intact to finger rub bilaterally  IX: Palate elevation is symmetric  XI: Shoulder shrug is intact  XII: Tongue movements are normal  Musculoskeletal: 5/5 in all 4 extremities. Normal tone. Reflexes: Bilateral biceps 2/4, triceps 2/4, brachial radialis 2/4, knee 2/4 and ankle 1/4. Planters: flexor bilaterally. Coordination: no pronator drift, no dysmetria. Finger nose finger testing within normal limits. Sensation: normal to all modalities. Gait/Posture: steady    I personally reviewed social history, past medical history, medications, allergy, surgical history, and family history as documented in the patient's electronic health records. Labs and/or neuroimaging and test results: reviewed and discussed with patient/family. 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 I don't feel her dementia is progressively worse and I don't think adding or changing Namenda will help the patient at this time.   She missed Namenda

## 2017-12-15 NOTE — PROGRESS NOTES
What is the year 1/1  What season are we in 1/1  What the date 0/1  What day of the week 1/1  What is the month 1/1  What state are we in 1/1  What county are we in 0/1  What town/city are we in 0/1  What hospital is nearby 0/1  What floor are we on 1/1  Repeat three words: clock , table , yellow 3 /3  Spell the word WORLD/count backwards from 100 by sevens (26,48,85,53,86)       4 /5   What were the three words you repeated after me   1 /3  Identify two simple objects; pencil/pen , paper 2 /2  Repeat after me \"No ifs ands or buts\" 1 /1  Take the paper in your right hand, fold it in half, and give it back to me 3 /3  Please read this statement, and do what it says (close your eyes) 1 /1  Write a sentence about anything you want (must contain a noun and a verb) 1 /1  Please copy this picture (intersecting pentagons) 0 /1  Total 22/30
steady    I personally reviewed social history, past medical history, medications, allergy, surgical history, and family history as documented in the patient's electronic health records. Labs and/or neuroimaging and test results: reviewed and discussed with patient/family. 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 I don't feel her dementia is progressively worse and I don't think adding or changing Namenda will help the patient at this time. She missed Namenda for one month and family describes significant worsening of her memory and behavior. I asked her family member to look for regular Namenda twice daily which could be cheaper than XR. The patient already tried Exelon patch and Aricept in the past.  I don't feel adding Galantamine will help at this time. We'll recheck B12 and folate  Risk of falling was discussed in details  Lifestyle adjustment, improving attention and concentration span, cognitive therapy and behavior therapy were addressed today. Continue the same blood pressure medications and monitor blood pressure at home  Continue Statin  Continue Celexa for depression. Could consider increasing dose to 20 mg daily  Aspirin for stroke prevention  Improving sleep hygiene  Continue with multivitamin  Melatonin as needed for episodic insomnia    Follow-up with me if new symptoms arise or family requesting changing medications. Patient's instructions:  1- Different behavioral modifications and cognitive therapy were addressed today. 2- Improving sleep hygiene. 3- Safety at home and risk of falls were discussed. 4- SE from medications. 5- Treatment of depression and mood disorders.

## 2017-12-19 ENCOUNTER — HOSPITAL ENCOUNTER (OUTPATIENT)
Dept: OTHER | Age: 82
Discharge: HOME | End: 2017-12-19
Attending: PSYCHIATRY & NEUROLOGY | Admitting: PSYCHIATRY & NEUROLOGY

## 2017-12-19 LAB
FOLATE: >20 NG/ML (ref 4.78–24.2)
TSH SERPL DL<=0.05 MIU/L-ACNC: 2.11 UIU/ML (ref 0.27–4.2)
VITAMIN B-12: 1050 PG/ML (ref 211–911)

## 2018-04-13 RX ORDER — SIMVASTATIN 40 MG
TABLET ORAL
Qty: 30 TABLET | Refills: 3 | Status: SHIPPED | OUTPATIENT
Start: 2018-04-13 | End: 2018-08-10 | Stop reason: SDUPTHER

## 2018-04-13 RX ORDER — CITALOPRAM 10 MG/1
TABLET ORAL
Qty: 30 TABLET | Refills: 3 | Status: SHIPPED | OUTPATIENT
Start: 2018-04-13 | End: 2018-08-10 | Stop reason: SDUPTHER

## 2018-04-13 RX ORDER — VALSARTAN 160 MG/1
TABLET ORAL
Qty: 30 TABLET | Refills: 3 | Status: SHIPPED | OUTPATIENT
Start: 2018-04-13 | End: 2018-07-19 | Stop reason: ALTCHOICE

## 2018-04-19 ENCOUNTER — OFFICE VISIT (OUTPATIENT)
Dept: FAMILY MEDICINE CLINIC | Age: 83
End: 2018-04-19

## 2018-04-19 VITALS
HEART RATE: 87 BPM | HEIGHT: 63 IN | WEIGHT: 137 LBS | DIASTOLIC BLOOD PRESSURE: 66 MMHG | OXYGEN SATURATION: 98 % | SYSTOLIC BLOOD PRESSURE: 132 MMHG | BODY MASS INDEX: 24.27 KG/M2

## 2018-04-19 DIAGNOSIS — F03.90 DEMENTIA WITHOUT BEHAVIORAL DISTURBANCE, UNSPECIFIED DEMENTIA TYPE: ICD-10-CM

## 2018-04-19 DIAGNOSIS — I10 ESSENTIAL HYPERTENSION: Primary | ICD-10-CM

## 2018-04-19 DIAGNOSIS — R26.9 GAIT ABNORMALITY: ICD-10-CM

## 2018-04-19 PROCEDURE — 99213 OFFICE O/P EST LOW 20 MIN: CPT | Performed by: FAMILY MEDICINE

## 2018-04-19 ASSESSMENT — PATIENT HEALTH QUESTIONNAIRE - PHQ9
1. LITTLE INTEREST OR PLEASURE IN DOING THINGS: 1
2. FEELING DOWN, DEPRESSED OR HOPELESS: 1
SUM OF ALL RESPONSES TO PHQ QUESTIONS 1-9: 2
SUM OF ALL RESPONSES TO PHQ9 QUESTIONS 1 & 2: 2

## 2018-04-19 ASSESSMENT — ENCOUNTER SYMPTOMS
COUGH: 0
SHORTNESS OF BREATH: 0
WHEEZING: 0

## 2018-04-30 ENCOUNTER — HOSPITAL ENCOUNTER (OUTPATIENT)
Dept: PHYSICAL THERAPY | Age: 83
Discharge: HOME | End: 2018-04-30
Admitting: FAMILY MEDICINE

## 2018-05-01 ENCOUNTER — HOSPITAL ENCOUNTER (OUTPATIENT)
Dept: OTHER | Age: 83
Discharge: HOME | End: 2018-05-31
Attending: FAMILY MEDICINE | Admitting: FAMILY MEDICINE

## 2018-05-09 ENCOUNTER — HOSPITAL ENCOUNTER (OUTPATIENT)
Dept: PHYSICAL THERAPY | Age: 83
Discharge: HOME | End: 2018-05-10
Admitting: FAMILY MEDICINE

## 2018-05-09 RX ORDER — AMLODIPINE BESYLATE 5 MG/1
TABLET ORAL
Qty: 30 TABLET | Refills: 6 | Status: SHIPPED | OUTPATIENT
Start: 2018-05-09 | End: 2018-07-19 | Stop reason: ALTCHOICE

## 2018-05-09 RX ORDER — ATENOLOL 25 MG/1
TABLET ORAL
Qty: 30 TABLET | Refills: 6 | Status: SHIPPED | OUTPATIENT
Start: 2018-05-09 | End: 2018-08-23 | Stop reason: SDUPTHER

## 2018-05-09 RX ORDER — OMEPRAZOLE 20 MG/1
CAPSULE, DELAYED RELEASE ORAL
Qty: 30 CAPSULE | Refills: 6 | Status: SHIPPED | OUTPATIENT
Start: 2018-05-09 | End: 2018-08-23 | Stop reason: SDUPTHER

## 2018-05-09 RX ORDER — MEMANTINE HYDROCHLORIDE 28 MG/1
CAPSULE, EXTENDED RELEASE ORAL
Qty: 30 CAPSULE | Refills: 6 | Status: SHIPPED | OUTPATIENT
Start: 2018-05-09 | End: 2018-05-21 | Stop reason: SDUPTHER

## 2018-05-21 ENCOUNTER — HOSPITAL ENCOUNTER (OUTPATIENT)
Dept: PHYSICAL THERAPY | Age: 83
Discharge: HOME | End: 2018-05-22
Admitting: FAMILY MEDICINE

## 2018-05-21 RX ORDER — MEMANTINE HYDROCHLORIDE 28 MG/1
CAPSULE, EXTENDED RELEASE ORAL
Qty: 30 CAPSULE | Refills: 11 | Status: SHIPPED | OUTPATIENT
Start: 2018-05-21 | End: 2018-08-23 | Stop reason: SDUPTHER

## 2018-05-30 ENCOUNTER — HOSPITAL ENCOUNTER (OUTPATIENT)
Dept: PHYSICAL THERAPY | Age: 83
Discharge: HOME | End: 2018-06-13
Admitting: FAMILY MEDICINE

## 2018-06-01 ENCOUNTER — HOSPITAL ENCOUNTER (OUTPATIENT)
Dept: OTHER | Age: 83
Discharge: HOME | End: 2018-06-01
Attending: FAMILY MEDICINE | Admitting: FAMILY MEDICINE

## 2018-06-13 RX ORDER — MONTELUKAST SODIUM 10 MG/1
10 TABLET ORAL NIGHTLY
Qty: 30 TABLET | Refills: 3 | Status: SHIPPED | OUTPATIENT
Start: 2018-06-13 | End: 2018-08-23 | Stop reason: SDUPTHER

## 2018-07-19 ENCOUNTER — OFFICE VISIT (OUTPATIENT)
Dept: FAMILY MEDICINE CLINIC | Age: 83
End: 2018-07-19

## 2018-07-19 VITALS
HEIGHT: 63 IN | HEART RATE: 72 BPM | WEIGHT: 140 LBS | SYSTOLIC BLOOD PRESSURE: 126 MMHG | DIASTOLIC BLOOD PRESSURE: 74 MMHG | OXYGEN SATURATION: 96 % | BODY MASS INDEX: 24.8 KG/M2

## 2018-07-19 DIAGNOSIS — I10 ESSENTIAL HYPERTENSION: Primary | ICD-10-CM

## 2018-07-19 DIAGNOSIS — F03.90 DEMENTIA WITHOUT BEHAVIORAL DISTURBANCE, UNSPECIFIED DEMENTIA TYPE: ICD-10-CM

## 2018-07-19 PROCEDURE — 99213 OFFICE O/P EST LOW 20 MIN: CPT | Performed by: FAMILY MEDICINE

## 2018-07-19 RX ORDER — OLMESARTAN MEDOXOMIL 20 MG/1
20 TABLET ORAL DAILY
Qty: 30 TABLET | Refills: 5 | Status: SHIPPED | OUTPATIENT
Start: 2018-07-19 | End: 2018-08-23 | Stop reason: SDUPTHER

## 2018-07-19 ASSESSMENT — ENCOUNTER SYMPTOMS
CONSTIPATION: 0
ABDOMINAL DISTENTION: 0
BLOOD IN STOOL: 0
DIARRHEA: 0
SHORTNESS OF BREATH: 0
COUGH: 0

## 2018-07-19 NOTE — PROGRESS NOTES
Subjective:      Patient ID: Ady Blount is a 80 y.o. female. HPI  Here to follow up BP and dementia    Living with son    C/o toes hurt,  Second right foot vianey  Some left hip pain    Left lower abdomen transient pain,  Not related to eating or bowel. A few minutes / gone  Bowels OK- apple juice helps        Review of Systems   Constitutional: Negative for unexpected weight change. Tires more easily,  Uses a walker. Family uses w/c when out or real tired   Respiratory: Negative for cough and shortness of breath. Cardiovascular: Negative for chest pain, palpitations and leg swelling. Gastrointestinal: Negative for abdominal distention, blood in stool, constipation and diarrhea. Genitourinary: Negative for dysuria, frequency and hematuria. Neurological: Negative for seizures, facial asymmetry, numbness and headaches. Psychiatric/Behavioral: Negative for self-injury and suicidal ideas. Dementia. Progressive, worsening over time. Family providing significant support         Objective:   Physical Exam   Constitutional: She is oriented to person, place, and time. She appears well-developed and well-nourished. No distress. Eyes: No scleral icterus. Neck: Neck supple. No thyromegaly present. Cardiovascular: Normal rate, regular rhythm and normal heart sounds. Pulmonary/Chest: No respiratory distress. She has no wheezes. Abdominal: She exhibits no distension and no mass. There is no tenderness. Musculoskeletal: She exhibits no edema. 2-3 rd toe right foot, some mild hammertoes    Left hip some tender   Lymphadenopathy:     She has no cervical adenopathy. Neurological: She is alert and oriented to person, place, and time. She has normal reflexes. A cranial nerve deficit is present. Psychiatric:   Flat,  Some dull sounding,  Monotone    Eye contact marginal   Vitals reviewed.       Assessment:         Dementia    Hypertension, treated, controlled      Plan:

## 2018-08-01 ENCOUNTER — APPOINTMENT (OUTPATIENT)
Dept: CT IMAGING | Age: 83
End: 2018-08-01
Payer: MEDICARE

## 2018-08-01 ENCOUNTER — HOSPITAL ENCOUNTER (OUTPATIENT)
Age: 83
Setting detail: OBSERVATION
Discharge: NURSING, REHAB OR LONG-TERM CARE | End: 2018-08-03
Attending: EMERGENCY MEDICINE | Admitting: INTERNAL MEDICINE
Payer: MEDICARE

## 2018-08-01 ENCOUNTER — APPOINTMENT (OUTPATIENT)
Dept: GENERAL RADIOLOGY | Age: 83
End: 2018-08-01
Payer: MEDICARE

## 2018-08-01 DIAGNOSIS — N39.0 URINARY TRACT INFECTION WITHOUT HEMATURIA, SITE UNSPECIFIED: ICD-10-CM

## 2018-08-01 DIAGNOSIS — R26.2 IMPAIRED AMBULATION: ICD-10-CM

## 2018-08-01 DIAGNOSIS — R41.82 ALTERED MENTAL STATUS, UNSPECIFIED ALTERED MENTAL STATUS TYPE: Primary | ICD-10-CM

## 2018-08-01 DIAGNOSIS — F03.90 DEMENTIA WITHOUT BEHAVIORAL DISTURBANCE, UNSPECIFIED DEMENTIA TYPE: ICD-10-CM

## 2018-08-01 DIAGNOSIS — R53.1 WEAKNESS: ICD-10-CM

## 2018-08-01 LAB
A/G RATIO: 1.5 (ref 1.1–2.2)
ALBUMIN SERPL-MCNC: 4.1 G/DL (ref 3.4–5)
ALP BLD-CCNC: 69 U/L (ref 40–129)
ALT SERPL-CCNC: 13 U/L (ref 10–40)
AMMONIA: 29 UMOL/L (ref 11–51)
ANION GAP SERPL CALCULATED.3IONS-SCNC: 11 MMOL/L (ref 3–16)
AST SERPL-CCNC: 23 U/L (ref 15–37)
BACTERIA: ABNORMAL /HPF
BASOPHILS ABSOLUTE: 0 K/UL (ref 0–0.2)
BASOPHILS RELATIVE PERCENT: 0.5 %
BILIRUB SERPL-MCNC: 0.4 MG/DL (ref 0–1)
BILIRUBIN URINE: NEGATIVE
BLOOD, URINE: NEGATIVE
BUN BLDV-MCNC: 15 MG/DL (ref 7–20)
CALCIUM SERPL-MCNC: 9.7 MG/DL (ref 8.3–10.6)
CHLORIDE BLD-SCNC: 100 MMOL/L (ref 99–110)
CLARITY: CLEAR
CO2: 29 MMOL/L (ref 21–32)
COLOR: YELLOW
CREAT SERPL-MCNC: 0.8 MG/DL (ref 0.6–1.2)
EOSINOPHILS ABSOLUTE: 0.1 K/UL (ref 0–0.6)
EOSINOPHILS RELATIVE PERCENT: 1.5 %
EPITHELIAL CELLS, UA: ABNORMAL /HPF
GFR AFRICAN AMERICAN: >60
GFR NON-AFRICAN AMERICAN: >60
GLOBULIN: 2.8 G/DL
GLUCOSE BLD-MCNC: 133 MG/DL (ref 70–99)
GLUCOSE URINE: NEGATIVE MG/DL
HCT VFR BLD CALC: 36.3 % (ref 36–48)
HEMOGLOBIN: 12.3 G/DL (ref 12–16)
KETONES, URINE: NEGATIVE MG/DL
LACTIC ACID: 0.9 MMOL/L (ref 0.4–2)
LEUKOCYTE ESTERASE, URINE: ABNORMAL
LYMPHOCYTES ABSOLUTE: 1.4 K/UL (ref 1–5.1)
LYMPHOCYTES RELATIVE PERCENT: 20.5 %
MCH RBC QN AUTO: 30.6 PG (ref 26–34)
MCHC RBC AUTO-ENTMCNC: 33.8 G/DL (ref 31–36)
MCV RBC AUTO: 90.5 FL (ref 80–100)
MICROSCOPIC EXAMINATION: YES
MONOCYTES ABSOLUTE: 0.4 K/UL (ref 0–1.3)
MONOCYTES RELATIVE PERCENT: 5.9 %
NEUTROPHILS ABSOLUTE: 4.7 K/UL (ref 1.7–7.7)
NEUTROPHILS RELATIVE PERCENT: 71.6 %
NITRITE, URINE: NEGATIVE
PDW BLD-RTO: 13.5 % (ref 12.4–15.4)
PH UA: 6.5
PLATELET # BLD: 194 K/UL (ref 135–450)
PMV BLD AUTO: 8.9 FL (ref 5–10.5)
POTASSIUM REFLEX MAGNESIUM: 4.5 MMOL/L (ref 3.5–5.1)
PRO-BNP: 788 PG/ML (ref 0–449)
PROTEIN UA: NEGATIVE MG/DL
RBC # BLD: 4.01 M/UL (ref 4–5.2)
RBC UA: ABNORMAL /HPF (ref 0–2)
SODIUM BLD-SCNC: 140 MMOL/L (ref 136–145)
SPECIFIC GRAVITY UA: 1.01
TOTAL CK: 67 U/L (ref 26–192)
TOTAL PROTEIN: 6.9 G/DL (ref 6.4–8.2)
TROPONIN: <0.01 NG/ML
URINE REFLEX TO CULTURE: YES
URINE TYPE: ABNORMAL
UROBILINOGEN, URINE: 0.2 E.U./DL
WBC # BLD: 6.6 K/UL (ref 4–11)
WBC UA: ABNORMAL /HPF (ref 0–5)

## 2018-08-01 PROCEDURE — G0378 HOSPITAL OBSERVATION PER HR: HCPCS

## 2018-08-01 PROCEDURE — 96365 THER/PROPH/DIAG IV INF INIT: CPT

## 2018-08-01 PROCEDURE — 96372 THER/PROPH/DIAG INJ SC/IM: CPT

## 2018-08-01 PROCEDURE — 6360000002 HC RX W HCPCS: Performed by: INTERNAL MEDICINE

## 2018-08-01 PROCEDURE — 99285 EMERGENCY DEPT VISIT HI MDM: CPT

## 2018-08-01 PROCEDURE — 83605 ASSAY OF LACTIC ACID: CPT

## 2018-08-01 PROCEDURE — 6360000002 HC RX W HCPCS: Performed by: PHYSICIAN ASSISTANT

## 2018-08-01 PROCEDURE — 70450 CT HEAD/BRAIN W/O DYE: CPT

## 2018-08-01 PROCEDURE — 84484 ASSAY OF TROPONIN QUANT: CPT

## 2018-08-01 PROCEDURE — 85025 COMPLETE CBC W/AUTO DIFF WBC: CPT

## 2018-08-01 PROCEDURE — 6370000000 HC RX 637 (ALT 250 FOR IP): Performed by: INTERNAL MEDICINE

## 2018-08-01 PROCEDURE — 80053 COMPREHEN METABOLIC PANEL: CPT

## 2018-08-01 PROCEDURE — 71045 X-RAY EXAM CHEST 1 VIEW: CPT

## 2018-08-01 PROCEDURE — 2580000003 HC RX 258: Performed by: INTERNAL MEDICINE

## 2018-08-01 PROCEDURE — 87086 URINE CULTURE/COLONY COUNT: CPT

## 2018-08-01 PROCEDURE — 2580000003 HC RX 258: Performed by: PHYSICIAN ASSISTANT

## 2018-08-01 PROCEDURE — 82550 ASSAY OF CK (CPK): CPT

## 2018-08-01 PROCEDURE — 82140 ASSAY OF AMMONIA: CPT

## 2018-08-01 PROCEDURE — 81001 URINALYSIS AUTO W/SCOPE: CPT

## 2018-08-01 PROCEDURE — 83880 ASSAY OF NATRIURETIC PEPTIDE: CPT

## 2018-08-01 RX ORDER — ATENOLOL 25 MG/1
25 TABLET ORAL DAILY
Status: DISCONTINUED | OUTPATIENT
Start: 2018-08-02 | End: 2018-08-03 | Stop reason: HOSPADM

## 2018-08-01 RX ORDER — PANTOPRAZOLE SODIUM 40 MG/1
40 TABLET, DELAYED RELEASE ORAL
Status: DISCONTINUED | OUTPATIENT
Start: 2018-08-02 | End: 2018-08-03 | Stop reason: HOSPADM

## 2018-08-01 RX ORDER — SODIUM CHLORIDE 0.9 % (FLUSH) 0.9 %
10 SYRINGE (ML) INJECTION PRN
Status: DISCONTINUED | OUTPATIENT
Start: 2018-08-01 | End: 2018-08-03 | Stop reason: HOSPADM

## 2018-08-01 RX ORDER — ASPIRIN 81 MG/1
81 TABLET, CHEWABLE ORAL DAILY
Status: DISCONTINUED | OUTPATIENT
Start: 2018-08-01 | End: 2018-08-03 | Stop reason: HOSPADM

## 2018-08-01 RX ORDER — SODIUM CHLORIDE 0.9 % (FLUSH) 0.9 %
10 SYRINGE (ML) INJECTION EVERY 12 HOURS SCHEDULED
Status: DISCONTINUED | OUTPATIENT
Start: 2018-08-01 | End: 2018-08-03 | Stop reason: HOSPADM

## 2018-08-01 RX ORDER — ONDANSETRON 2 MG/ML
4 INJECTION INTRAMUSCULAR; INTRAVENOUS EVERY 6 HOURS PRN
Status: DISCONTINUED | OUTPATIENT
Start: 2018-08-01 | End: 2018-08-01 | Stop reason: CLARIF

## 2018-08-01 RX ORDER — 0.9 % SODIUM CHLORIDE 0.9 %
1000 INTRAVENOUS SOLUTION INTRAVENOUS ONCE
Status: COMPLETED | OUTPATIENT
Start: 2018-08-01 | End: 2018-08-01

## 2018-08-01 RX ORDER — NITROGLYCERIN 0.4 MG/1
0.4 TABLET SUBLINGUAL EVERY 5 MIN PRN
Status: DISCONTINUED | OUTPATIENT
Start: 2018-08-01 | End: 2018-08-03 | Stop reason: HOSPADM

## 2018-08-01 RX ORDER — LOSARTAN POTASSIUM 25 MG/1
25 TABLET ORAL DAILY
Status: DISCONTINUED | OUTPATIENT
Start: 2018-08-02 | End: 2018-08-03 | Stop reason: HOSPADM

## 2018-08-01 RX ORDER — CALCIUM CARBONATE 500(1250)
1000 TABLET ORAL DAILY
Status: DISCONTINUED | OUTPATIENT
Start: 2018-08-02 | End: 2018-08-03 | Stop reason: HOSPADM

## 2018-08-01 RX ORDER — ALBUTEROL SULFATE 90 UG/1
2 AEROSOL, METERED RESPIRATORY (INHALATION) EVERY 6 HOURS PRN
Status: DISCONTINUED | OUTPATIENT
Start: 2018-08-01 | End: 2018-08-03 | Stop reason: HOSPADM

## 2018-08-01 RX ORDER — MONTELUKAST SODIUM 10 MG/1
10 TABLET ORAL NIGHTLY
Status: DISCONTINUED | OUTPATIENT
Start: 2018-08-02 | End: 2018-08-03 | Stop reason: HOSPADM

## 2018-08-01 RX ORDER — CITALOPRAM 20 MG/1
10 TABLET ORAL DAILY
Status: DISCONTINUED | OUTPATIENT
Start: 2018-08-01 | End: 2018-08-03 | Stop reason: HOSPADM

## 2018-08-01 RX ORDER — MEMANTINE HYDROCHLORIDE 5 MG/1
5 TABLET ORAL 2 TIMES DAILY
Status: DISCONTINUED | OUTPATIENT
Start: 2018-08-01 | End: 2018-08-03 | Stop reason: HOSPADM

## 2018-08-01 RX ORDER — SIMVASTATIN 10 MG
20 TABLET ORAL NIGHTLY
Status: DISCONTINUED | OUTPATIENT
Start: 2018-08-01 | End: 2018-08-03 | Stop reason: HOSPADM

## 2018-08-01 RX ADMIN — VITAMIN D, TAB 1000IU (100/BT) 1000 UNITS: 25 TAB at 20:20

## 2018-08-01 RX ADMIN — MEMANTINE HYDROCHLORIDE 5 MG: 5 TABLET ORAL at 20:20

## 2018-08-01 RX ADMIN — ASPIRIN 81 MG 81 MG: 81 TABLET ORAL at 20:20

## 2018-08-01 RX ADMIN — SIMVASTATIN 20 MG: 10 TABLET, FILM COATED ORAL at 20:20

## 2018-08-01 RX ADMIN — ENOXAPARIN SODIUM 40 MG: 40 INJECTION SUBCUTANEOUS at 20:20

## 2018-08-01 RX ADMIN — CEFTRIAXONE SODIUM 1 G: 1 INJECTION, POWDER, FOR SOLUTION INTRAMUSCULAR; INTRAVENOUS at 16:43

## 2018-08-01 RX ADMIN — Medication 10 ML: at 20:20

## 2018-08-01 RX ADMIN — CITALOPRAM HYDROBROMIDE 10 MG: 20 TABLET ORAL at 20:19

## 2018-08-01 RX ADMIN — SODIUM CHLORIDE 1000 ML: 9 INJECTION, SOLUTION INTRAVENOUS at 16:43

## 2018-08-01 ASSESSMENT — PAIN SCALES - GENERAL: PAINLEVEL_OUTOF10: 0

## 2018-08-01 NOTE — ED PROVIDER NOTES
CHIEF COMPLAINT   Extremity Weakness (started today. daughter had to carry her ) and Altered Mental Status (has dementia, but seems to be more confused today )      PATIENT INFORMATION  Yusuf Chinchilla is a 80 y.o. female who presents to the ED for evaluation, brought in by squad, for evaluation of generalized, diffuse, weakness especially upon waking in the morning, accompanied by a change in mental status. Patient has baseline dementia however family indicates the patient is more confused today than normal.  Patient lives at home with family. Uncertain if she's had fevers or not. Patient was able to walk without difficulty one week ago. This morning patient's daughter had to carry her out of bed to the restroom. Patient denies any numbness or tingling. She indicates that she has waking up throughout the night and feeling like she is trapped and very fearful. I have reviewed the following from the nursing documentation, and I have confirmed the past medical history, medications, allergies, social history and family history with the patient. Past Medical History:   Diagnosis Date    Anxiety     CAD (coronary artery disease)     Dementia     alzheimer type    Hyperlipidemia     Hypertension     Memory loss 2015    dementia like loss     Past Surgical History:   Procedure Laterality Date    ANGIOPLASTY  2002 ?  SKIN BIOPSY  07/19/217    2 spots removed      No family history on file. Social History     Social History    Marital status:      Spouse name: N/A    Number of children: N/A    Years of education: N/A     Occupational History    Not on file.      Social History Main Topics    Smoking status: Never Smoker    Smokeless tobacco: Never Used    Alcohol use No    Drug use: No    Sexual activity: Not Currently     Other Topics Concern    Not on file     Social History Narrative    No narrative on file     Current Facility-Administered Medications   Medication Dose

## 2018-08-01 NOTE — PROGRESS NOTES
4 Eyes Skin Assessment     The patient is being assess for   Admission    I agree that 2 RN's have performed a thorough Head to Toe Skin Assessment on the patient. ALL assessment sites listed below have been assessed. Sweta Cox RN    Areas assessed by both nurses:   [x]   Head, Face, and Ears   [x]   Shoulders, Back, and Chest, Abdomen  [x]   Arms, Elbows, and Hands   [x]   Coccyx, Sacrum, and Ischium  [x]   Legs, Feet, and Heels        No skin issues noted. **SHARE this note so that the co-signing nurse is able to place an eSignature**    Co-signer eSignature: Electronically signed by Lin Sheppard RN on 8/1/18 at 7:42 PM    Does the Patient have Skin Breakdown?   No          Brian Prevention initiated:  Yes   Wound Care Orders initiated:  No      [facility] nurse consulted for Pressure Injury (Stage 3,4, Unstageable, DTI, NWPT, Complex wounds)and New or Established Ostomies:  NA      Primary Nurse eSignature: Electronically signed by Dario Urbano RN on 8/1/18 at 7:32 PM

## 2018-08-02 PROBLEM — F03.90 DEMENTIA WITHOUT BEHAVIORAL DISTURBANCE (HCC): Status: ACTIVE | Noted: 2017-12-15

## 2018-08-02 LAB
ANION GAP SERPL CALCULATED.3IONS-SCNC: 11 MMOL/L (ref 3–16)
BASOPHILS ABSOLUTE: 0 K/UL (ref 0–0.2)
BASOPHILS RELATIVE PERCENT: 0.5 %
BUN BLDV-MCNC: 15 MG/DL (ref 7–20)
CALCIUM SERPL-MCNC: 9.2 MG/DL (ref 8.3–10.6)
CHLORIDE BLD-SCNC: 103 MMOL/L (ref 99–110)
CO2: 28 MMOL/L (ref 21–32)
CREAT SERPL-MCNC: 0.9 MG/DL (ref 0.6–1.2)
EOSINOPHILS ABSOLUTE: 0.3 K/UL (ref 0–0.6)
EOSINOPHILS RELATIVE PERCENT: 5 %
GFR AFRICAN AMERICAN: >60
GFR NON-AFRICAN AMERICAN: 60
GLUCOSE BLD-MCNC: 117 MG/DL (ref 70–99)
HCT VFR BLD CALC: 32.2 % (ref 36–48)
HEMOGLOBIN: 11.1 G/DL (ref 12–16)
LYMPHOCYTES ABSOLUTE: 1.9 K/UL (ref 1–5.1)
LYMPHOCYTES RELATIVE PERCENT: 30 %
MCH RBC QN AUTO: 31.1 PG (ref 26–34)
MCHC RBC AUTO-ENTMCNC: 34.5 G/DL (ref 31–36)
MCV RBC AUTO: 90.2 FL (ref 80–100)
MONOCYTES ABSOLUTE: 0.6 K/UL (ref 0–1.3)
MONOCYTES RELATIVE PERCENT: 9.7 %
NEUTROPHILS ABSOLUTE: 3.4 K/UL (ref 1.7–7.7)
NEUTROPHILS RELATIVE PERCENT: 54.8 %
PDW BLD-RTO: 13.5 % (ref 12.4–15.4)
PLATELET # BLD: 176 K/UL (ref 135–450)
PMV BLD AUTO: 9.6 FL (ref 5–10.5)
POTASSIUM REFLEX MAGNESIUM: 4.4 MMOL/L (ref 3.5–5.1)
RBC # BLD: 3.56 M/UL (ref 4–5.2)
SODIUM BLD-SCNC: 142 MMOL/L (ref 136–145)
WBC # BLD: 6.2 K/UL (ref 4–11)

## 2018-08-02 PROCEDURE — 97161 PT EVAL LOW COMPLEX 20 MIN: CPT

## 2018-08-02 PROCEDURE — 6360000002 HC RX W HCPCS: Performed by: INTERNAL MEDICINE

## 2018-08-02 PROCEDURE — G8987 SELF CARE CURRENT STATUS: HCPCS

## 2018-08-02 PROCEDURE — 6360000002 HC RX W HCPCS: Performed by: PHYSICIAN ASSISTANT

## 2018-08-02 PROCEDURE — G8979 MOBILITY GOAL STATUS: HCPCS

## 2018-08-02 PROCEDURE — 85025 COMPLETE CBC W/AUTO DIFF WBC: CPT

## 2018-08-02 PROCEDURE — 99234 HOSP IP/OBS SM DT SF/LOW 45: CPT | Performed by: INTERNAL MEDICINE

## 2018-08-02 PROCEDURE — G8988 SELF CARE GOAL STATUS: HCPCS

## 2018-08-02 PROCEDURE — 6370000000 HC RX 637 (ALT 250 FOR IP): Performed by: INTERNAL MEDICINE

## 2018-08-02 PROCEDURE — 96366 THER/PROPH/DIAG IV INF ADDON: CPT

## 2018-08-02 PROCEDURE — G8978 MOBILITY CURRENT STATUS: HCPCS

## 2018-08-02 PROCEDURE — G0378 HOSPITAL OBSERVATION PER HR: HCPCS

## 2018-08-02 PROCEDURE — 2580000003 HC RX 258: Performed by: INTERNAL MEDICINE

## 2018-08-02 PROCEDURE — 36415 COLL VENOUS BLD VENIPUNCTURE: CPT

## 2018-08-02 PROCEDURE — 97535 SELF CARE MNGMENT TRAINING: CPT

## 2018-08-02 PROCEDURE — 97530 THERAPEUTIC ACTIVITIES: CPT

## 2018-08-02 PROCEDURE — 96372 THER/PROPH/DIAG INJ SC/IM: CPT

## 2018-08-02 PROCEDURE — 97166 OT EVAL MOD COMPLEX 45 MIN: CPT

## 2018-08-02 PROCEDURE — 97116 GAIT TRAINING THERAPY: CPT

## 2018-08-02 PROCEDURE — 80048 BASIC METABOLIC PNL TOTAL CA: CPT

## 2018-08-02 PROCEDURE — 2580000003 HC RX 258: Performed by: PHYSICIAN ASSISTANT

## 2018-08-02 RX ADMIN — VITAMIN D, TAB 1000IU (100/BT) 1000 UNITS: 25 TAB at 08:24

## 2018-08-02 RX ADMIN — Medication 10 ML: at 20:27

## 2018-08-02 RX ADMIN — ATENOLOL 25 MG: 25 TABLET ORAL at 08:24

## 2018-08-02 RX ADMIN — CITALOPRAM HYDROBROMIDE 10 MG: 20 TABLET ORAL at 08:24

## 2018-08-02 RX ADMIN — MEMANTINE HYDROCHLORIDE 5 MG: 5 TABLET ORAL at 20:27

## 2018-08-02 RX ADMIN — ASPIRIN 81 MG 81 MG: 81 TABLET ORAL at 08:24

## 2018-08-02 RX ADMIN — SIMVASTATIN 20 MG: 10 TABLET, FILM COATED ORAL at 20:27

## 2018-08-02 RX ADMIN — LOSARTAN POTASSIUM 25 MG: 25 TABLET, FILM COATED ORAL at 08:23

## 2018-08-02 RX ADMIN — CEFTRIAXONE SODIUM 1 G: 1 INJECTION, POWDER, FOR SOLUTION INTRAMUSCULAR; INTRAVENOUS at 15:56

## 2018-08-02 RX ADMIN — Medication 10 ML: at 08:23

## 2018-08-02 RX ADMIN — Medication 1000 MG: at 08:24

## 2018-08-02 RX ADMIN — MEMANTINE HYDROCHLORIDE 5 MG: 5 TABLET ORAL at 08:23

## 2018-08-02 RX ADMIN — ENOXAPARIN SODIUM 40 MG: 40 INJECTION SUBCUTANEOUS at 08:23

## 2018-08-02 RX ADMIN — MONTELUKAST SODIUM 10 MG: 10 TABLET, FILM COATED ORAL at 20:27

## 2018-08-02 ASSESSMENT — PAIN SCALES - GENERAL: PAINLEVEL_OUTOF10: 0

## 2018-08-02 NOTE — PROGRESS NOTES
142 lb 1.6 oz (64.5 kg)   SpO2 95%   BMI 25.17 kg/m²   SPO2 (COPD values may differ): Greater than or equal to 92% on room air = 0    Peak Flow (asthma only): not applicable = 0    RSI: 0-4 = See once and convert to home regimen or discontinue        Plan       Goals: mobilize retained secretions, volume expansion and improve oxygenation    Patient/caregiver was educated on the proper method of use for Respiratory Care Devices:  Yes      Level of patient/caregiver understanding able to:   [] Verbalize understanding   [] Demonstrate understanding       [] Teach back        [] Needs reinforcement       []  No available caregiver               []  Other:     Response to education:  Excellent     Is patient being placed on Home Treatment Regimen? No     Does the patient have everything they need prior to discharge? Yes     Comments: pt chart reviewed     Plan of Care: place pt on home regimen    Electronically signed by Anais Gill RCP on 8/1/2018 at 11:28 PM    Respiratory Protocol Guidelines     1. Assessment and treatment by Respiratory Therapy will be initiated for medication and therapeutic interventions upon initiation of aerosolized medication. 2. Physician will be contacted for respiratory rate (RR) greater than 35 breaths per minute. Therapy will be held for heart rate (HR) greater than 140 beats per minute, pending direction from physician. 3. Bronchodilators will be administered via Metered Dose Inhaler (MDI) with spacer when the following criteria are met:  a. Alert and cooperative     b. HR < 140 bpm  c. RR < 30 bpm                d. Can demonstrate a 23 second inspiratory hold  4. Bronchodilators will be administered via Hand Held Nebulizer [facility]) to patients when ANY of the following criteria are met  a. Incognizant or uncooperative          b. Patients treated with HHN at Home        c. Unable to demonstrate proper use of MDI with spacer     d. RR > 30 bpm   5.  Bronchodilators will be delivered via Metered Dose Inhaler (MDI), HHN, Aerogen to intubated patients on mechanical ventilation. 6. Inhalation medication orders will be delivered and/or substituted as outlined below. Aerosolized Medications Ordering and Administration Guidelines:    1. All Medications will be ordered by a physician, and their frequency and/or modality will be adjusted as defined by the patients Respiratory Severity Index (RSI) score. 2. If the patient does not have documented COPD, consider discontinuing anticholinergics when RSI is less than 9.  3. If the bronchospasm worsens (increased RSI), then the bronchodilator frequency can be increased to a maximum of every 4 hours. If greater than every 4 hours is required, the physician will be contacted. 4. If the bronchospasm improves, the frequency of the bronchodilator can be decreased, based on the patient's RSI, but not less than home treatment regimen frequency. 5. Bronchodilator(s) will be discontinued if patient has a RSI less than 9 and has received no scheduled or as needed treatment for 72  Hrs. Patients Ordered on a Mucolytic Agent:    1. Must always be administered with a bronchodilator. 2. Discontinue if patient experiences worsened bronchospasm, or secretions have lessened to the point that the patient is able to clear them with a cough. Anti-inflammatory and Combination Medications:    1. If the patient lacks prior history of lung disease, is not using inhaled anti-inflammatory medication at home, and lacks wheezing by examination or by history for at least 24 hours, contact physician for possible discontinuation.

## 2018-08-02 NOTE — PLAN OF CARE
Problem: Falls - Risk of:  Goal: Will remain free from falls  Will remain free from falls   Outcome: Ongoing    Goal: Absence of physical injury  Absence of physical injury   Outcome: Ongoing      Problem: Risk for Impaired Skin Integrity  Goal: Tissue integrity - skin and mucous membranes  Structural intactness and normal physiological function of skin and  mucous membranes.    Outcome: Ongoing      Problem: Infection:  Goal: Will remain free from infection  Will remain free from infection  Outcome: Ongoing      Problem: Safety:  Goal: Free from accidental physical injury  Free from accidental physical injury  Outcome: Ongoing    Goal: Free from intentional harm  Free from intentional harm  Outcome: Ongoing      Problem: Daily Care:  Goal: Daily care needs are met  Daily care needs are met  Outcome: Ongoing      Problem: Pain:  Goal: Patient's pain/discomfort is manageable  Patient's pain/discomfort is manageable  Outcome: Ongoing

## 2018-08-02 NOTE — H&P
Hospital Medicine History & Physical      PCP: Arden Murphy DO    Date of Admission: 8/1/2018    Date of Service: Pt seen/examined on 8/2/2018     Chief Complaint:    Chief Complaint   Patient presents with    Extremity Weakness     started today. daughter had to carry her     Altered Mental Status     has dementia, but seems to be more confused today          History Of Present Illness: The patient is a 80 y.o. female with a PMH of HTN, HLD, Dementia and hx of TIAs who presented to [facility] ED with complaint of increasing weakness. Pt normally gets around home well with a walker. Pt states a couple days ago she felt so week she could not get out of bed. She lives at home with her son and his family. No recent falls within the last week. Pt endorses dysuria, frequency of urination and weakness but denies fever, hematuria, chest pain, SOB, abdominal pain, N/V/D/C, slurred speech, facial drooping and lightheadedness. Family states that her mentation is at baseline and that they are comfortable with her going home after discharge. Pt has six sons who all help to take care of her. Past Medical History:        Diagnosis Date    Anxiety     CAD (coronary artery disease)     Dementia     alzheimer type    Hyperlipidemia     Hypertension     Memory loss 2015    dementia like loss       Past Surgical History:        Procedure Laterality Date    ANGIOPLASTY  2002 ?  SKIN BIOPSY  07/19/217    2 spots removed        Medications Prior to Admission:    Prior to Admission medications    Medication Sig Start Date End Date Taking?  Authorizing Provider   olmesartan (BENICAR) 20 MG tablet Take 1 tablet by mouth daily 7/19/18  Yes Arden Murphy DO   montelukast (SINGULAIR) 10 MG tablet Take 1 tablet by mouth nightly For sinus / chest 6/13/18  Yes Arden Murphy DO   NAMENDA XR 28 MG CP24 extended release capsule TAKE 1 CAPSULE BY MOUTH DAILY 5/21/18  Yes Arden Murphy DO   atenolol (TENORMIN) 25 MG tablet Take 1 tablet by mouth daily 5/9/18  Yes Gonzales Alvarado, DO   omeprazole (PRILOSEC) 20 MG delayed release capsule TAKE 1 CAPSULE BY MOUTH DAILY FOR STOMACH ACID 5/9/18  Yes Gonzales Alvarado, DO   simvastatin (ZOCOR) 40 MG tablet TAKE ONE TABLET BY MOUTH AT BEDTIME 4/13/18  Yes Gonzales Alvarado, DO   citalopram (CELEXA) 10 MG tablet TAKE 1 TABLET BY MOUTH DAILY 4/13/18  Yes Gonzales Alvarado, DO   Multiple Vitamins-Minerals (VITABASIC COMPLETE PO) Take by mouth   Yes Historical Provider, MD   Simethicone (GAS RELIEF 125 MAX ST PO) Take by mouth daily   Yes Historical Provider, MD   MELATONIN PO Take by mouth as needed   Yes Historical Provider, MD   calcium carbonate (OSCAL) 500 MG TABS tablet Take 1,000 mg by mouth 2 times daily. Yes Historical Provider, MD   Cholecalciferol (VITAMIN D3) 1000 UNITS CAPS Take 1 capsule by mouth daily. Yes Historical Provider, MD   aspirin 81 MG tablet Take 81 mg by mouth daily. Yes Historical Provider, MD   albuterol sulfate HFA (VENTOLIN HFA) 108 (90 Base) MCG/ACT inhaler Inhale 2 puffs into the lungs every 6 hours as needed for Wheezing 7/17/17   Osorio Gutierrez MD   NITROSTAT 0.4 MG SL tablet PLACE 1 TABLET UNDER TONGUE EVERY 5 MINUTES AS NEEDED FOR CHEST PAIN 6/15/16   Gonzales Alvarado, DO       Allergies:  Aricept Lennette Be hcl]; Buspar [buspirone]; and Xanax [alprazolam]    Social History:  The patient currently lives with son. TOBACCO:   reports that she has never smoked. She has never used smokeless tobacco.  ETOH:   reports that she does not drink alcohol. Family History:   Positive as follows:    No family history on file.     REVIEW OF SYSTEMS:     Constitutional: Negative for fever, + weakness   HENT: Negative for sore throat   Eyes: Negative for redness   Respiratory: Negative  for dyspnea, cough   Cardiovascular: Negative for chest pain   Gastrointestinal: Negative for vomiting, diarrhea   Genitourinary: Negative for hematuria, + dysuria, frequency of urination wall thickness.   Global ejection fraction is normal and estimated at 55 %.   No regional wall motion abnormalities are noted.   Diastolic filling parameters suggests grade I diastolic dysfunction    Mitral valve is structurally normal.   Mild mitral regurgitation is present.   Aortic valve appears sclerotic but opens adequately. Aortic leaflets are  Kevyn Poole aortic regurgitation is present.   The aortic root is normal in size.     ASSESSMENT/PLAN:    Generalized Weakness  Difficulty with Ambulation  Suspected Parkinsons  - CT head: no acute abnormality  - Admit to Med Surg  - fall precautions  - PT/OT eval done - recommend SNF  UTI  - pt c/o dysuria, frequency  - small leukocytes, 10-20 WBCs  - urine cx  - Rocephin- change to oral macrobid pending cx    HTN  - uncontrolled on admission  - cont Atenolol, Losartan  - monitor    Dementia  - family states this is her baseline  - cont Namenda    HLD  - cont Statin    Anxiety  - cont Celexa    GERD  - cont Protonix    Hx of TIA  - cont ASA    DVT Prophylaxis: Lovenox  Diet: DIET GENERAL;  Code Status: Full Code    Fariha Fox PA-C 10:06 AM 8/2/2018    Dc home with oral macrobid for UTI     Agree with above  Changes made to note  Recommend neurology as outpt    Rich Payan MD 8/2/2018 10:56 AM

## 2018-08-02 NOTE — FLOWSHEET NOTE
08/01/18 1924   Vital Signs   Temp 97.8 °F (36.6 °C)   Temp Source Oral   Pulse 73   Heart Rate Source Monitor   Resp 16   BP (!) 148/70   BP Location Right upper arm   BP Upper/Lower Upper   Patient Position Semi fowlers   Level of Consciousness 0   MEWS Score 1   Oxygen Therapy   SpO2 95 %   O2 Device None (Room air)   Assessment complete- see flowsheets. Pt denies needs at this time. Will move pt from [facility] to a private room d/t family and pt request. Will continue to monitor.   Harriet Toro RN

## 2018-08-03 VITALS
HEIGHT: 63 IN | WEIGHT: 142.1 LBS | SYSTOLIC BLOOD PRESSURE: 120 MMHG | DIASTOLIC BLOOD PRESSURE: 60 MMHG | OXYGEN SATURATION: 95 % | HEART RATE: 72 BPM | TEMPERATURE: 98.6 F | BODY MASS INDEX: 25.18 KG/M2 | RESPIRATION RATE: 16 BRPM

## 2018-08-03 LAB — URINE CULTURE, ROUTINE: NORMAL

## 2018-08-03 PROCEDURE — 97116 GAIT TRAINING THERAPY: CPT

## 2018-08-03 PROCEDURE — G8988 SELF CARE GOAL STATUS: HCPCS

## 2018-08-03 PROCEDURE — 2580000003 HC RX 258: Performed by: INTERNAL MEDICINE

## 2018-08-03 PROCEDURE — 6370000000 HC RX 637 (ALT 250 FOR IP): Performed by: INTERNAL MEDICINE

## 2018-08-03 PROCEDURE — 2700000000 HC OXYGEN THERAPY PER DAY

## 2018-08-03 PROCEDURE — 94761 N-INVAS EAR/PLS OXIMETRY MLT: CPT

## 2018-08-03 PROCEDURE — 99217 PR OBSERVATION CARE DISCHARGE MANAGEMENT: CPT | Performed by: INTERNAL MEDICINE

## 2018-08-03 PROCEDURE — G8989 SELF CARE D/C STATUS: HCPCS

## 2018-08-03 PROCEDURE — 96372 THER/PROPH/DIAG INJ SC/IM: CPT

## 2018-08-03 PROCEDURE — 96366 THER/PROPH/DIAG IV INF ADDON: CPT

## 2018-08-03 PROCEDURE — 6360000002 HC RX W HCPCS: Performed by: PHYSICIAN ASSISTANT

## 2018-08-03 PROCEDURE — 2580000003 HC RX 258: Performed by: PHYSICIAN ASSISTANT

## 2018-08-03 PROCEDURE — G8980 MOBILITY D/C STATUS: HCPCS

## 2018-08-03 PROCEDURE — 6360000002 HC RX W HCPCS: Performed by: INTERNAL MEDICINE

## 2018-08-03 PROCEDURE — G8987 SELF CARE CURRENT STATUS: HCPCS

## 2018-08-03 PROCEDURE — 97110 THERAPEUTIC EXERCISES: CPT

## 2018-08-03 PROCEDURE — G0378 HOSPITAL OBSERVATION PER HR: HCPCS

## 2018-08-03 PROCEDURE — G8979 MOBILITY GOAL STATUS: HCPCS

## 2018-08-03 RX ADMIN — CITALOPRAM HYDROBROMIDE 10 MG: 20 TABLET ORAL at 07:57

## 2018-08-03 RX ADMIN — ATENOLOL 25 MG: 25 TABLET ORAL at 07:57

## 2018-08-03 RX ADMIN — CEFTRIAXONE SODIUM 1 G: 1 INJECTION, POWDER, FOR SOLUTION INTRAMUSCULAR; INTRAVENOUS at 15:37

## 2018-08-03 RX ADMIN — VITAMIN D, TAB 1000IU (100/BT) 1000 UNITS: 25 TAB at 07:57

## 2018-08-03 RX ADMIN — ENOXAPARIN SODIUM 40 MG: 40 INJECTION SUBCUTANEOUS at 07:57

## 2018-08-03 RX ADMIN — Medication 10 ML: at 07:58

## 2018-08-03 RX ADMIN — MEMANTINE HYDROCHLORIDE 5 MG: 5 TABLET ORAL at 07:57

## 2018-08-03 RX ADMIN — Medication 1000 MG: at 07:57

## 2018-08-03 RX ADMIN — ASPIRIN 81 MG 81 MG: 81 TABLET ORAL at 07:57

## 2018-08-03 RX ADMIN — LOSARTAN POTASSIUM 25 MG: 25 TABLET, FILM COATED ORAL at 07:57

## 2018-08-03 NOTE — FLOWSHEET NOTE
08/02/18 1903   Vital Signs   Temp 97.7 °F (36.5 °C)   Temp Source Oral   Pulse 71   Heart Rate Source Monitor   Resp 17   BP (!) 163/63   BP Location Right upper arm   BP Upper/Lower Upper   Patient Position Semi fowlers   Level of Consciousness 0   MEWS Score 1   Patient Currently in Pain Denies   Oxygen Therapy   SpO2 96 %   O2 Device None (Room air)   Assessment complete- see flowsheets. Pt denies pain at this time, pt also does not want medicine for elevated BP, afraid it will drop her too low. Pt educated on breathing techniques to help relax her and hopefully lower BP some. Will reassess BP later. Pt denies needs at this time, grand daughter and other family at bedside. Will continue to monitor.   Susan Escalante RN

## 2018-08-03 NOTE — DISCHARGE SUMMARY
Name:  Bev Sweeney  Room:  0204/0204-01  MRN:    [id number]    IM Discharge Summary    Discharging Physician:  Melissa Child MD    Admit: 8/1/2018    Discharge:  8/3/2018    PCP      Floridalma Pineda DO    Diagnoses and hospital course  this Admission      Generalized Weakness  Difficulty with Ambulation  Suspected Parkinsons  - CT head: no acute abnormality  - Admit to Med Surg  - fall precautions  - PT/OT eval done - recommend SNF    UTI  - pt c/o dysuria, frequency  - small leukocytes, 10-20 WBCs  - urine cx  - Rocephin given while in hosp. cx remain neg. stop abx    HTN  - uncontrolled on admission  - cont Atenolol, Losartan  - monitor     Dementia  - family states this is her baseline  - cont Namenda     HLD  - cont Statin     Anxiety  - cont Celexa     GERD  - cont Protonix     Hx of TIA  - cont ASA         HPI     80 y.o. female with a PMH of HTN, HLD, Dementia and hx of TIAs who presented to [facility] ED with complaint of increasing weakness. Pt normally gets around home well with a walker. Pt states a couple days ago she felt so week she could not get out of bed. She lives at home with her son and his family. No recent falls within the last week. Pt endorses dysuria, frequency of urination and weakness but denies fever, hematuria, chest pain, SOB, abdominal pain, N/V/D/C, slurred speech, facial drooping and lightheadedness. Physical Exam at Discharge:      General:  eldelry female, flat effect. Slow to talk  Awake, alert and oriented. Appears to be not in any distress  Mucous Membranes:  Pink , anicteric  Neck: No JVD, no carotid bruit, no thyromegaly  Chest:  Clear to auscultation bilaterally, no added sounds  Cardiovascular:  RRR S1S2 heard, no murmurs or gallops  Abdomen:  Soft, undistended, non tender, no organomegaly, BS present  Extremities: mild cog wheel rigidity but no akinesia or tremor   No edema or cyanosis.  Distal pulses well felt  Neurological : grossly normal  CN intact, generalized weakness wit no focal findings  ? PD    Radiology (Please Review Full Report for Details)       RADIOLOGY     CT Head WO Contrast 8/1/2018   Final Result   No acute intracranial abnormality or interval change from July 2016.       Stable benign para falcine meningioma near the convexity. Stable mild old   microvascular ischemic change both cerebral hemispheres.           XR CHEST PORTABLE 8/1/2018   Final Result   No acute cardiopulmonary disease.              Pertinent previous results reviewed      TTE 8/2014  Left ventricle size is normal.   Normal left ventricular wall thickness.   Global ejection fraction is normal and estimated at 55 %.   No regional wall motion abnormalities are noted.   Diastolic filling parameters suggests grade I diastolic dysfunction    Mitral valve is structurally normal.   Mild mitral regurgitation is present.   Aortic valve appears sclerotic but opens adequately. Aortic leaflets are  Brooke Deist aortic regurgitation is present.   The aortic root is normal in size. Consults:    1. PT . No results for input(s): WBC, HGB, HCT, MCV, PLT in the last 72 hours. No results for input(s): NA, K, CL, CO2, PHOS, BUN, CREATININE in the last 72 hours.     Invalid input(s): CA    CBC:  Lab Results   Component Value Date    WBC 6.2 08/02/2018    HGB 11.1 08/02/2018    HCT 32.2 08/02/2018    MCV 90.2 08/02/2018     08/02/2018    NEUTOPHILPCT 54.8 08/02/2018    LYMPHOPCT 30.0 08/02/2018    MONOPCT 9.7 08/02/2018    BASOPCT 0.5 08/02/2018    NEUTROABS 3.4 08/02/2018    LYMPHSABS 1.9 08/02/2018    MONOSABS 0.6 08/02/2018    EOSABS 0.3 08/02/2018    BASOSABS 0.0 08/02/2018     BNP:   Lab Results   Component Value Date     08/02/2018    K 4.4 08/02/2018    CO2 28 08/02/2018    BUN 15 08/02/2018    CREATININE 0.9 08/02/2018    CALCIUM 9.2 08/02/2018                Medication List      CONTINUE taking these medications    albuterol sulfate  (90 Base) MCG/ACT inhaler  Commonly known as:  VENTOLIN HFA  Inhale 2 puffs into the lungs every 6 hours as needed for Wheezing     aspirin 81 MG tablet     atenolol 25 MG tablet  Commonly known as:  TENORMIN  Take 1 tablet by mouth daily     calcium carbonate 500 MG Tabs tablet  Commonly known as:  OSCAL     GAS RELIEF 125 MAX ST PO     MELATONIN PO     montelukast 10 MG tablet  Commonly known as:  SINGULAIR  Take 1 tablet by mouth nightly For sinus / chest     NAMENDA XR 28 MG Cp24 extended release capsule  Generic drug:  memantine ER  TAKE 1 CAPSULE BY MOUTH DAILY     NITROSTAT 0.4 MG SL tablet  Generic drug:  nitroGLYCERIN  PLACE 1 TABLET UNDER TONGUE EVERY 5 MINUTES AS NEEDED FOR CHEST PAIN     olmesartan 20 MG tablet  Commonly known as:  BENICAR  Take 1 tablet by mouth daily     omeprazole 20 MG delayed release capsule  Commonly known as:  PRILOSEC  TAKE 1 CAPSULE BY MOUTH DAILY FOR STOMACH ACID     VITABASIC COMPLETE PO     Vitamin D3 1000 units Caps              Discharge Condition/Location: stable/snf    Follow Up:    PCP in 1 weeks      Time Spent on discharge is more than 28 minutes in the examination, evaluation, counseling and review of medications and discharge plan.       Richelle Sanchez MD 8/16/2018 6:34 AM

## 2018-08-03 NOTE — PROGRESS NOTES
Inpatient Physical Therapy Daily Treatment Note    Unit: 2West  Date:  8/3/2018  Patient Name:    Rob Cox  Admitting diagnosis:  General weakness [R53.1]  Admit Date:  8/1/2018  Precautions/Restrictions:  Fall risk, bed/chair alarm    Discharge Recommendations: SNF  AM-PAC Score: 16  DME needs at discharge: Defer to facility     Treatment Time: 0780-8674  Treatment Number:  2    Subjective    Pt. Supine in bed with no family present. Pt. agreeable to therapy session this PM.     Pain    None noted    Bed Mobility Deferred, pt in chair  Supine to Sit:   Sit to Supine:   Rolling:   Scooting:     Transfer Training   Sit to stand: Mod A from chair (x 2), from commode    Min A from chair (x 1) - final stand with max cues to promote anterior weight shift  Stand to sit: Mod A to control descent to chair (VC for hand placement)  Bed to Chair: DNT, pt ambulatory    Gait Training   Patient ambulated with RW and min to mod A x 25' + 25' + 65' + 65'  Pt demonstrates decreased saul, narrow FRED - 1-2 bout of crossing over, decreased step length bilaterally  Max VC for increasing step length, pt able to correct. 2 freezing episodes with inability to initiate swing phase. Tactile cues to facilitate step. Therapeutic Exercise   Standing Marches: 2 x 5 with mod A for balance    Balance     Sitting: Good in chair  Standing: Fair with RW, min to mod A     Patient Education       Role of acute care PT, safe sequencing and set-up of transfers, gait sequencing    Positioning Needs       Pt reclined in chair with call light and needs within reach. Family present in room at end of treatment session. Activity Tolerance     SpO2: 96-98% on RA following ambulation  HR: 80s following ambulation    Assessment   Patient tolerating greater distance of ambulation today. Pt requiring max verbal cues for proper step length and safe sequencing with RW. Continue to reinforce proper set-up and sequencing with transfers.  Recommend SNF

## 2018-08-13 ENCOUNTER — TELEPHONE (OUTPATIENT)
Dept: FAMILY MEDICINE CLINIC | Age: 83
End: 2018-08-13

## 2018-08-13 NOTE — TELEPHONE ENCOUNTER
She can be referred for testing. She needs a neurologist. She saw Dr. You Burgos previously, and she can see him again for this. If they want another neurologist, second opinion, we can refer that too.   Ask them what they want us to do

## 2018-08-13 NOTE — TELEPHONE ENCOUNTER
Pt was last seen on 7/19/18. She is currently at a rehab facility. They are suggesting that she be tested for parkinsons. Does she need an appt or can she be referred. Please call back and advise.

## 2018-08-17 ENCOUNTER — TELEPHONE (OUTPATIENT)
Dept: FAMILY MEDICINE CLINIC | Age: 83
End: 2018-08-17

## 2018-08-23 ENCOUNTER — OFFICE VISIT (OUTPATIENT)
Dept: FAMILY MEDICINE CLINIC | Age: 83
End: 2018-08-23

## 2018-08-23 VITALS
BODY MASS INDEX: 24.45 KG/M2 | DIASTOLIC BLOOD PRESSURE: 52 MMHG | SYSTOLIC BLOOD PRESSURE: 96 MMHG | OXYGEN SATURATION: 95 % | WEIGHT: 138 LBS | HEIGHT: 63 IN | HEART RATE: 73 BPM

## 2018-08-23 DIAGNOSIS — N10 ACUTE PYELONEPHRITIS: ICD-10-CM

## 2018-08-23 DIAGNOSIS — F03.90 DEMENTIA WITHOUT BEHAVIORAL DISTURBANCE, UNSPECIFIED DEMENTIA TYPE: Primary | ICD-10-CM

## 2018-08-23 PROCEDURE — 99213 OFFICE O/P EST LOW 20 MIN: CPT | Performed by: FAMILY MEDICINE

## 2018-08-23 RX ORDER — MEMANTINE HYDROCHLORIDE 28 MG/1
CAPSULE, EXTENDED RELEASE ORAL
Qty: 30 CAPSULE | Refills: 11 | Status: SHIPPED | OUTPATIENT
Start: 2018-08-23

## 2018-08-23 RX ORDER — CITALOPRAM 10 MG/1
TABLET ORAL
Qty: 30 TABLET | Refills: 5 | Status: SHIPPED | OUTPATIENT
Start: 2018-08-23 | End: 2019-01-04 | Stop reason: SDUPTHER

## 2018-08-23 RX ORDER — SIMVASTATIN 40 MG
TABLET ORAL
Qty: 30 TABLET | Refills: 5 | Status: SHIPPED | OUTPATIENT
Start: 2018-08-23 | End: 2019-01-04 | Stop reason: SDUPTHER

## 2018-08-23 RX ORDER — MONTELUKAST SODIUM 10 MG/1
10 TABLET ORAL NIGHTLY
Qty: 30 TABLET | Refills: 5 | Status: SHIPPED | OUTPATIENT
Start: 2018-08-23

## 2018-08-23 RX ORDER — OLMESARTAN MEDOXOMIL 20 MG/1
20 TABLET ORAL DAILY
Qty: 30 TABLET | Refills: 5 | Status: SHIPPED | OUTPATIENT
Start: 2018-08-23 | End: 2019-01-04 | Stop reason: SDUPTHER

## 2018-08-23 RX ORDER — ATENOLOL 25 MG/1
TABLET ORAL
Qty: 30 TABLET | Refills: 11 | Status: SHIPPED | OUTPATIENT
Start: 2018-08-23 | End: 2018-10-16 | Stop reason: SINTOL

## 2018-08-23 RX ORDER — OMEPRAZOLE 20 MG/1
CAPSULE, DELAYED RELEASE ORAL
Qty: 30 CAPSULE | Refills: 6 | Status: SHIPPED | OUTPATIENT
Start: 2018-08-23 | End: 2019-01-04 | Stop reason: SDUPTHER

## 2018-08-23 ASSESSMENT — ENCOUNTER SYMPTOMS
SHORTNESS OF BREATH: 0
COUGH: 0

## 2018-08-23 NOTE — TELEPHONE ENCOUNTER
Called & lm for pts son, Ирина Boudreaux with details.   Apologized for the delay in responding if he had not received this msg before today

## 2018-08-23 NOTE — PROGRESS NOTES
Subjective:      Patient ID: Heaven Flores is a 80 y.o. female. HPI  Post nursing home / hospital check  Admitted [facility] 8/1 to 8/3. [place] - SNF,  D/c 8/22    Son says Home nurse coming out tomorrow    Dx was UTI / [address]  Family thinks set back on mentation / cognition    Med list reviewed and reconciled    Review of Systems   Constitutional: Negative for unexpected weight change. Respiratory: Negative for cough and shortness of breath. Not great activity tolerance    Sleep 2-3 pillowsa   Cardiovascular: Negative for chest pain and leg swelling. Gastrointestinal:        Appetite good. Bowels are OK       Objective:   Physical Exam   Constitutional: No distress. Neck: Neck supple. No thyromegaly present. Cardiovascular: Normal rate and normal heart sounds. No murmur heard. Pulmonary/Chest: Effort normal. No respiratory distress. She has no wheezes. She has no rales. Musculoskeletal: She exhibits no edema. Assessment:        Urinary tract infection, resolving    Mental status change secondary to UTI, resolving  Dementia      Plan:        At home has elevated toilet seat and w/c, walker, some DME stuff    Agree with home nursing eval    Seems stable and more at her baseline and not. Has care needs will be ongoing. Family is extremely supportive and helpful. We will see what can be added or not by home nursing evaluation. Continue the current medications. See me 3 months    Prior to Visit Medications    Medication Sig Taking?  Authorizing Provider   simvastatin (ZOCOR) 40 MG tablet TAKE ONE TABLET BY MOUTH AT BEDTIME Yes Eric Ly DO   citalopram (CELEXA) 10 MG tablet TAKE 1 TABLET BY MOUTH DAILY Yes Eric Ly DO   olmesartan (BENICAR) 20 MG tablet Take 1 tablet by mouth daily Yes Eric Ly DO   montelukast (SINGULAIR) 10 MG tablet Take 1 tablet by mouth nightly For sinus / chest Yes Eric Ly DO   memantine ER (NAMENDA XR) 28 MG CP24 extended release capsule TAKE 1 CAPSULE BY MOUTH DAILY Yes Arden Murphy DO   atenolol (TENORMIN) 25 MG tablet Take 1 tablet by mouth daily Yes Arden Murphy DO   omeprazole (PRILOSEC) 20 MG delayed release capsule TAKE 1 CAPSULE BY MOUTH DAILY FOR STOMACH ACID Yes Arden Murphy DO   albuterol sulfate HFA (VENTOLIN HFA) 108 (90 Base) MCG/ACT inhaler Inhale 2 puffs into the lungs every 6 hours as needed for Wheezing Yes Rodriguez Colon MD   Multiple Vitamins-Minerals (VITABASIC COMPLETE PO) Take by mouth Yes Historical Provider, MD   Simethicone (GAS RELIEF 125 MAX ST PO) Take by mouth daily Yes Historical Provider, MD   MELATONIN PO Take by mouth as needed Yes Historical Provider, MD   NITROSTAT 0.4 MG SL tablet PLACE 1 TABLET UNDER TONGUE EVERY 5 MINUTES AS NEEDED FOR CHEST PAIN Yes Arden Murphy DO   calcium carbonate (OSCAL) 500 MG TABS tablet Take 1,000 mg by mouth 2 times daily. Yes Historical Provider, MD   Cholecalciferol (VITAMIN D3) 1000 UNITS CAPS Take 1 capsule by mouth daily. Yes Historical Provider, MD   aspirin 81 MG tablet Take 81 mg by mouth daily.  Yes Historical Provider, MD Arden Murphy, DO

## 2018-09-04 ENCOUNTER — TELEPHONE (OUTPATIENT)
Dept: FAMILY MEDICINE CLINIC | Age: 83
End: 2018-09-04

## 2018-09-05 ENCOUNTER — HOSPITAL ENCOUNTER (OUTPATIENT)
Age: 83
Setting detail: SPECIMEN
Discharge: HOME | End: 2018-09-05
Payer: MEDICARE

## 2018-09-05 ENCOUNTER — OFFICE VISIT (OUTPATIENT)
Dept: NEUROLOGY | Age: 83
End: 2018-09-05

## 2018-09-05 VITALS
BODY MASS INDEX: 24.41 KG/M2 | DIASTOLIC BLOOD PRESSURE: 58 MMHG | OXYGEN SATURATION: 96 % | SYSTOLIC BLOOD PRESSURE: 114 MMHG | HEART RATE: 75 BPM | WEIGHT: 143 LBS | HEIGHT: 64 IN

## 2018-09-05 DIAGNOSIS — F34.1 DYSTHYMIA: ICD-10-CM

## 2018-09-05 DIAGNOSIS — G30.9 DEMENTIA DUE TO ALZHEIMER'S DISEASE (HCC): Primary | ICD-10-CM

## 2018-09-05 DIAGNOSIS — I10 HTN (HYPERTENSION), BENIGN: ICD-10-CM

## 2018-09-05 DIAGNOSIS — E78.5 DYSLIPIDEMIA: ICD-10-CM

## 2018-09-05 DIAGNOSIS — F02.80 DEMENTIA DUE TO ALZHEIMER'S DISEASE (HCC): Primary | ICD-10-CM

## 2018-09-05 DIAGNOSIS — R27.0 ATAXIA: ICD-10-CM

## 2018-09-05 DIAGNOSIS — F02.80 DEMENTIA WITH PARKINSONISM (HCC): ICD-10-CM

## 2018-09-05 DIAGNOSIS — G20 DEMENTIA WITH PARKINSONISM (HCC): ICD-10-CM

## 2018-09-05 LAB
BILIRUBIN URINE: NEGATIVE
BLOOD, URINE: NEGATIVE
CLARITY: CLEAR
COLOR: YELLOW
GLUCOSE URINE: NEGATIVE MG/DL
KETONES, URINE: NEGATIVE MG/DL
LEUKOCYTE ESTERASE, URINE: NEGATIVE
MICROSCOPIC EXAMINATION: NORMAL
NITRITE, URINE: NEGATIVE
PH UA: 6.5
PROTEIN UA: NEGATIVE MG/DL
SPECIFIC GRAVITY UA: 1.01
URINE TYPE: NORMAL
UROBILINOGEN, URINE: 0.2 E.U./DL

## 2018-09-05 PROCEDURE — 81003 URINALYSIS AUTO W/O SCOPE: CPT

## 2018-09-05 PROCEDURE — 99214 OFFICE O/P EST MOD 30 MIN: CPT | Performed by: PSYCHIATRY & NEUROLOGY

## 2018-09-05 PROCEDURE — 87086 URINE CULTURE/COLONY COUNT: CPT

## 2018-09-05 NOTE — PROGRESS NOTES
daily Yes Melida Solid, DO   omeprazole (PRILOSEC) 20 MG delayed release capsule TAKE 1 CAPSULE BY MOUTH DAILY FOR STOMACH ACID Yes Melida Solid, DO   Multiple Vitamins-Minerals (VITABASIC COMPLETE PO) Take by mouth Yes Historical Provider, MD   Simethicone (GAS RELIEF 125 MAX ST PO) Take by mouth daily Yes Historical Provider, MD   MELATONIN PO Take by mouth as needed Yes Historical Provider, MD   NITROSTAT 0.4 MG SL tablet PLACE 1 TABLET UNDER TONGUE EVERY 5 MINUTES AS NEEDED FOR CHEST PAIN Yes Melida Solid, DO   calcium carbonate (OSCAL) 500 MG TABS tablet Take 1,000 mg by mouth 2 times daily. Yes Historical Provider, MD   Cholecalciferol (VITAMIN D3) 1000 UNITS CAPS Take 1 capsule by mouth daily. Yes Historical Provider, MD   aspirin 81 MG tablet Take 81 mg by mouth daily. Yes Historical Provider, MD     Allergies   Allergen Reactions    Aricept [Donepezil Hcl]      Nausea      Buspar [Buspirone] Other (See Comments)     Makes her delirious    Xanax [Alprazolam] Other (See Comments)     Per family     Social History   Substance Use Topics    Smoking status: Never Smoker    Smokeless tobacco: Never Used    Alcohol use No     History reviewed. No pertinent family history. Past Surgical History:   Procedure Laterality Date    ANGIOPLASTY  2002 ?  SKIN BIOPSY  07/19/217    2 spots removed          Exam:   Constitutional:   Vitals:    09/05/18 1054   BP: (!) 114/58   Pulse: 75   SpO2: 96%   Weight: 143 lb (64.9 kg)   Height: 5' 4\" (1.626 m)       General appearance: well-nourished. Eye: No icterus. PRRR. Neck: supple  Cardiovascular: No carotid bruit. Heart: S1, S2         No lower leg edema with good pulsation. Mental Status: AAO times two. Poor immediate recall. Poor insight. Fluent. No masked facies. Cranial Nerves:   II: Visual fields: Full to confrontation  III: Pupils: equal, round, reactive to light  III,IV,VI: Extra Ocular Movements are intact.  No nystagmus  V: Facial sensation

## 2018-09-06 LAB — URINE CULTURE, ROUTINE: NORMAL

## 2018-10-16 ENCOUNTER — OFFICE VISIT (OUTPATIENT)
Dept: FAMILY MEDICINE CLINIC | Age: 83
End: 2018-10-16
Payer: MEDICARE

## 2018-10-16 VITALS
WEIGHT: 144 LBS | TEMPERATURE: 98.2 F | SYSTOLIC BLOOD PRESSURE: 126 MMHG | RESPIRATION RATE: 16 BRPM | HEART RATE: 75 BPM | BODY MASS INDEX: 24.72 KG/M2 | DIASTOLIC BLOOD PRESSURE: 60 MMHG | OXYGEN SATURATION: 93 %

## 2018-10-16 DIAGNOSIS — I50.20 SYSTOLIC CONGESTIVE HEART FAILURE, UNSPECIFIED HF CHRONICITY (HCC): Primary | ICD-10-CM

## 2018-10-16 DIAGNOSIS — R60.0 BILATERAL LOWER EXTREMITY EDEMA: ICD-10-CM

## 2018-10-16 DIAGNOSIS — R06.01 ORTHOPNEA: ICD-10-CM

## 2018-10-16 DIAGNOSIS — F02.80 DEMENTIA ASSOCIATED WITH OTHER UNDERLYING DISEASE WITHOUT BEHAVIORAL DISTURBANCE (HCC): ICD-10-CM

## 2018-10-16 DIAGNOSIS — Z23 NEED FOR PROPHYLACTIC VACCINATION AND INOCULATION AGAINST VARICELLA: ICD-10-CM

## 2018-10-16 DIAGNOSIS — I50.20 SYSTOLIC CONGESTIVE HEART FAILURE, UNSPECIFIED HF CHRONICITY (HCC): ICD-10-CM

## 2018-10-16 LAB — PRO-BNP: 699 PG/ML (ref 0–449)

## 2018-10-16 PROCEDURE — 99214 OFFICE O/P EST MOD 30 MIN: CPT | Performed by: NURSE PRACTITIONER

## 2018-10-16 RX ORDER — CARVEDILOL 3.12 MG/1
3.12 TABLET ORAL 2 TIMES DAILY
Qty: 60 TABLET | Refills: 3 | Status: SHIPPED | OUTPATIENT
Start: 2018-10-16 | End: 2019-01-04 | Stop reason: SDUPTHER

## 2018-10-16 RX ORDER — SPIRONOLACTONE 25 MG/1
25 TABLET ORAL DAILY
Qty: 30 TABLET | Refills: 3 | Status: ON HOLD | OUTPATIENT
Start: 2018-10-16 | End: 2018-12-20 | Stop reason: HOSPADM

## 2018-10-16 ASSESSMENT — ENCOUNTER SYMPTOMS
CHEST TIGHTNESS: 1
WHEEZING: 1
SHORTNESS OF BREATH: 1
COUGH: 1
ABDOMINAL PAIN: 1

## 2018-10-26 ENCOUNTER — HOSPITAL ENCOUNTER (OUTPATIENT)
Dept: GENERAL RADIOLOGY | Age: 83
Discharge: HOME | End: 2018-10-26
Payer: MEDICARE

## 2018-10-26 ENCOUNTER — HOSPITAL ENCOUNTER (OUTPATIENT)
Dept: NON INVASIVE DIAGNOSTICS | Age: 83
Discharge: HOME | End: 2018-10-26
Payer: MEDICARE

## 2018-10-26 DIAGNOSIS — F02.80 DEMENTIA ASSOCIATED WITH OTHER UNDERLYING DISEASE WITHOUT BEHAVIORAL DISTURBANCE (HCC): ICD-10-CM

## 2018-10-26 LAB
LV EF: 58 %
LVEF MODALITY: NORMAL

## 2018-10-26 PROCEDURE — 93306 TTE W/DOPPLER COMPLETE: CPT

## 2018-10-26 PROCEDURE — 77080 DXA BONE DENSITY AXIAL: CPT

## 2018-12-05 ENCOUNTER — TELEPHONE (OUTPATIENT)
Dept: FAMILY MEDICINE CLINIC | Age: 83
End: 2018-12-05

## 2018-12-06 DIAGNOSIS — M25.552 PAIN OF BOTH HIP JOINTS: Primary | ICD-10-CM

## 2018-12-06 DIAGNOSIS — M25.551 PAIN OF BOTH HIP JOINTS: Primary | ICD-10-CM

## 2018-12-07 ENCOUNTER — HOSPITAL ENCOUNTER (OUTPATIENT)
Dept: GENERAL RADIOLOGY | Age: 83
Discharge: HOME | End: 2018-12-07
Payer: MEDICARE

## 2018-12-07 ENCOUNTER — HOSPITAL ENCOUNTER (OUTPATIENT)
Age: 83
Discharge: HOME | End: 2018-12-07
Payer: MEDICARE

## 2018-12-07 DIAGNOSIS — M25.552 PAIN OF BOTH HIP JOINTS: ICD-10-CM

## 2018-12-07 DIAGNOSIS — M25.551 PAIN OF BOTH HIP JOINTS: ICD-10-CM

## 2018-12-07 PROCEDURE — 73521 X-RAY EXAM HIPS BI 2 VIEWS: CPT

## 2018-12-10 ENCOUNTER — TELEPHONE (OUTPATIENT)
Dept: FAMILY MEDICINE CLINIC | Age: 83
End: 2018-12-10

## 2018-12-10 RX ORDER — HYDROXYZINE HYDROCHLORIDE 10 MG/1
10 TABLET, FILM COATED ORAL 2 TIMES DAILY PRN
Qty: 30 TABLET | Refills: 1 | Status: SHIPPED | OUTPATIENT
Start: 2018-12-10 | End: 2018-12-18 | Stop reason: ALTCHOICE

## 2018-12-10 NOTE — TELEPHONE ENCOUNTER
Pt son called Luisa Merino, not on HIPAA) saying he talked to Dr. Ximena Li last week and pt had her xray of her hip done 12.7.18 so he would like those results. Please review and advise.  Thank you

## 2018-12-17 ENCOUNTER — TELEPHONE (OUTPATIENT)
Dept: FAMILY MEDICINE CLINIC | Age: 83
End: 2018-12-17

## 2018-12-18 ENCOUNTER — APPOINTMENT (OUTPATIENT)
Dept: GENERAL RADIOLOGY | Age: 83
DRG: 690 | End: 2018-12-18
Payer: MEDICARE

## 2018-12-18 ENCOUNTER — HOSPITAL ENCOUNTER (INPATIENT)
Age: 83
LOS: 2 days | Discharge: HOME | DRG: 690 | End: 2018-12-20
Attending: EMERGENCY MEDICINE | Admitting: INTERNAL MEDICINE
Payer: MEDICARE

## 2018-12-18 DIAGNOSIS — E86.0 DEHYDRATION: ICD-10-CM

## 2018-12-18 DIAGNOSIS — N39.0 URINARY TRACT INFECTION WITHOUT HEMATURIA, SITE UNSPECIFIED: ICD-10-CM

## 2018-12-18 DIAGNOSIS — R06.01 ORTHOPNEA: ICD-10-CM

## 2018-12-18 DIAGNOSIS — R53.1 GENERALIZED WEAKNESS: Primary | ICD-10-CM

## 2018-12-18 DIAGNOSIS — R07.9 CHEST PAIN, UNSPECIFIED TYPE: ICD-10-CM

## 2018-12-18 PROBLEM — N30.00 ACUTE CYSTITIS WITHOUT HEMATURIA: Status: ACTIVE | Noted: 2018-12-18

## 2018-12-18 LAB
A/G RATIO: 1.3 (ref 1.1–2.2)
ALBUMIN SERPL-MCNC: 3.9 G/DL (ref 3.4–5)
ALP BLD-CCNC: 60 U/L (ref 40–129)
ALT SERPL-CCNC: 11 U/L (ref 10–40)
ANION GAP SERPL CALCULATED.3IONS-SCNC: 11 MMOL/L (ref 3–16)
AST SERPL-CCNC: 18 U/L (ref 15–37)
BACTERIA: ABNORMAL /HPF
BILIRUB SERPL-MCNC: 0.3 MG/DL (ref 0–1)
BILIRUBIN URINE: NEGATIVE
BLOOD, URINE: NEGATIVE
BUN BLDV-MCNC: 27 MG/DL (ref 7–20)
CALCIUM SERPL-MCNC: 9.6 MG/DL (ref 8.3–10.6)
CHLORIDE BLD-SCNC: 100 MMOL/L (ref 99–110)
CLARITY: CLEAR
CO2: 28 MMOL/L (ref 21–32)
COLOR: YELLOW
CREAT SERPL-MCNC: 1.2 MG/DL (ref 0.6–1.2)
EPITHELIAL CELLS, UA: ABNORMAL /HPF
GFR AFRICAN AMERICAN: 52
GFR NON-AFRICAN AMERICAN: 43
GLOBULIN: 3.1 G/DL
GLUCOSE BLD-MCNC: 98 MG/DL (ref 70–99)
GLUCOSE URINE: NEGATIVE MG/DL
HCT VFR BLD CALC: 33.6 % (ref 36–48)
HEMOGLOBIN: 11.2 G/DL (ref 12–16)
KETONES, URINE: NEGATIVE MG/DL
LEUKOCYTE ESTERASE, URINE: ABNORMAL
MCH RBC QN AUTO: 30.8 PG (ref 26–34)
MCHC RBC AUTO-ENTMCNC: 33.5 G/DL (ref 31–36)
MCV RBC AUTO: 92.1 FL (ref 80–100)
MICROSCOPIC EXAMINATION: YES
NITRITE, URINE: NEGATIVE
PDW BLD-RTO: 13.4 % (ref 12.4–15.4)
PH UA: 6
PLATELET # BLD: 206 K/UL (ref 135–450)
PMV BLD AUTO: 9.1 FL (ref 5–10.5)
POTASSIUM SERPL-SCNC: 5.1 MMOL/L (ref 3.5–5.1)
PROTEIN UA: NEGATIVE MG/DL
RBC # BLD: 3.64 M/UL (ref 4–5.2)
RBC UA: ABNORMAL /HPF (ref 0–2)
SODIUM BLD-SCNC: 139 MMOL/L (ref 136–145)
SPECIFIC GRAVITY UA: 1.01
TOTAL PROTEIN: 7 G/DL (ref 6.4–8.2)
TROPONIN: <0.01 NG/ML
URINE TYPE: ABNORMAL
UROBILINOGEN, URINE: 0.2 E.U./DL
WBC # BLD: 7 K/UL (ref 4–11)
WBC UA: ABNORMAL /HPF (ref 0–5)

## 2018-12-18 PROCEDURE — G0378 HOSPITAL OBSERVATION PER HR: HCPCS

## 2018-12-18 PROCEDURE — 6360000002 HC RX W HCPCS: Performed by: NURSE PRACTITIONER

## 2018-12-18 PROCEDURE — 81001 URINALYSIS AUTO W/SCOPE: CPT

## 2018-12-18 PROCEDURE — 85027 COMPLETE CBC AUTOMATED: CPT

## 2018-12-18 PROCEDURE — 1200000000 HC SEMI PRIVATE

## 2018-12-18 PROCEDURE — 96361 HYDRATE IV INFUSION ADD-ON: CPT

## 2018-12-18 PROCEDURE — 6370000000 HC RX 637 (ALT 250 FOR IP): Performed by: NURSE PRACTITIONER

## 2018-12-18 PROCEDURE — 6360000002 HC RX W HCPCS: Performed by: INTERNAL MEDICINE

## 2018-12-18 PROCEDURE — 80053 COMPREHEN METABOLIC PANEL: CPT

## 2018-12-18 PROCEDURE — 6370000000 HC RX 637 (ALT 250 FOR IP): Performed by: INTERNAL MEDICINE

## 2018-12-18 PROCEDURE — 71046 X-RAY EXAM CHEST 2 VIEWS: CPT

## 2018-12-18 PROCEDURE — 2580000003 HC RX 258: Performed by: INTERNAL MEDICINE

## 2018-12-18 PROCEDURE — 96366 THER/PROPH/DIAG IV INF ADDON: CPT

## 2018-12-18 PROCEDURE — 99285 EMERGENCY DEPT VISIT HI MDM: CPT

## 2018-12-18 PROCEDURE — 2580000003 HC RX 258: Performed by: NURSE PRACTITIONER

## 2018-12-18 PROCEDURE — 51701 INSERT BLADDER CATHETER: CPT

## 2018-12-18 PROCEDURE — 36415 COLL VENOUS BLD VENIPUNCTURE: CPT

## 2018-12-18 PROCEDURE — 84484 ASSAY OF TROPONIN QUANT: CPT

## 2018-12-18 PROCEDURE — 96372 THER/PROPH/DIAG INJ SC/IM: CPT

## 2018-12-18 PROCEDURE — 96365 THER/PROPH/DIAG IV INF INIT: CPT

## 2018-12-18 PROCEDURE — 93005 ELECTROCARDIOGRAM TRACING: CPT | Performed by: NURSE PRACTITIONER

## 2018-12-18 RX ORDER — MEMANTINE HYDROCHLORIDE 5 MG/1
10 TABLET ORAL 2 TIMES DAILY
Status: DISCONTINUED | OUTPATIENT
Start: 2018-12-18 | End: 2018-12-20 | Stop reason: HOSPADM

## 2018-12-18 RX ORDER — ASPIRIN 81 MG/1
324 TABLET, CHEWABLE ORAL ONCE
Status: COMPLETED | OUTPATIENT
Start: 2018-12-18 | End: 2018-12-18

## 2018-12-18 RX ORDER — ASPIRIN 81 MG/1
81 TABLET, CHEWABLE ORAL DAILY
Status: DISCONTINUED | OUTPATIENT
Start: 2018-12-19 | End: 2018-12-18 | Stop reason: SDUPTHER

## 2018-12-18 RX ORDER — ASPIRIN 81 MG/1
81 TABLET ORAL DAILY
Status: DISCONTINUED | OUTPATIENT
Start: 2018-12-18 | End: 2018-12-20 | Stop reason: HOSPADM

## 2018-12-18 RX ORDER — SODIUM CHLORIDE 9 MG/ML
INJECTION, SOLUTION INTRAVENOUS CONTINUOUS
Status: DISCONTINUED | OUTPATIENT
Start: 2018-12-18 | End: 2018-12-19

## 2018-12-18 RX ORDER — MONTELUKAST SODIUM 10 MG/1
10 TABLET ORAL NIGHTLY
Status: DISCONTINUED | OUTPATIENT
Start: 2018-12-18 | End: 2018-12-20 | Stop reason: HOSPADM

## 2018-12-18 RX ORDER — SODIUM CHLORIDE 0.9 % (FLUSH) 0.9 %
10 SYRINGE (ML) INJECTION PRN
Status: DISCONTINUED | OUTPATIENT
Start: 2018-12-18 | End: 2018-12-20 | Stop reason: HOSPADM

## 2018-12-18 RX ORDER — PANTOPRAZOLE SODIUM 40 MG/1
40 TABLET, DELAYED RELEASE ORAL
Status: DISCONTINUED | OUTPATIENT
Start: 2018-12-19 | End: 2018-12-20 | Stop reason: HOSPADM

## 2018-12-18 RX ORDER — ONDANSETRON 2 MG/ML
4 INJECTION INTRAMUSCULAR; INTRAVENOUS EVERY 6 HOURS PRN
Status: DISCONTINUED | OUTPATIENT
Start: 2018-12-18 | End: 2018-12-20 | Stop reason: HOSPADM

## 2018-12-18 RX ORDER — CITALOPRAM 20 MG/1
10 TABLET ORAL DAILY
Status: DISCONTINUED | OUTPATIENT
Start: 2018-12-18 | End: 2018-12-20 | Stop reason: HOSPADM

## 2018-12-18 RX ORDER — ATORVASTATIN CALCIUM 40 MG/1
40 TABLET, FILM COATED ORAL NIGHTLY
Status: DISCONTINUED | OUTPATIENT
Start: 2018-12-18 | End: 2018-12-20 | Stop reason: HOSPADM

## 2018-12-18 RX ORDER — 0.9 % SODIUM CHLORIDE 0.9 %
1000 INTRAVENOUS SOLUTION INTRAVENOUS ONCE
Status: COMPLETED | OUTPATIENT
Start: 2018-12-18 | End: 2018-12-18

## 2018-12-18 RX ORDER — SODIUM CHLORIDE 0.9 % (FLUSH) 0.9 %
10 SYRINGE (ML) INJECTION EVERY 12 HOURS SCHEDULED
Status: DISCONTINUED | OUTPATIENT
Start: 2018-12-18 | End: 2018-12-20 | Stop reason: HOSPADM

## 2018-12-18 RX ORDER — CARVEDILOL 3.12 MG/1
3.12 TABLET ORAL 2 TIMES DAILY
Status: DISCONTINUED | OUTPATIENT
Start: 2018-12-18 | End: 2018-12-20 | Stop reason: HOSPADM

## 2018-12-18 RX ORDER — SIMVASTATIN 40 MG
40 TABLET ORAL NIGHTLY
Status: DISCONTINUED | OUTPATIENT
Start: 2018-12-18 | End: 2018-12-18 | Stop reason: ALTCHOICE

## 2018-12-18 RX ADMIN — ATORVASTATIN CALCIUM 40 MG: 40 TABLET, FILM COATED ORAL at 21:13

## 2018-12-18 RX ADMIN — ASPIRIN 81 MG 324 MG: 81 TABLET ORAL at 13:36

## 2018-12-18 RX ADMIN — ENOXAPARIN SODIUM 40 MG: 40 INJECTION SUBCUTANEOUS at 17:12

## 2018-12-18 RX ADMIN — SODIUM CHLORIDE: 9 INJECTION, SOLUTION INTRAVENOUS at 17:13

## 2018-12-18 RX ADMIN — VITAMIN D, TAB 1000IU (100/BT) 1000 UNITS: 25 TAB at 17:13

## 2018-12-18 RX ADMIN — CEFTRIAXONE SODIUM 1 G: 1 INJECTION, POWDER, FOR SOLUTION INTRAMUSCULAR; INTRAVENOUS at 12:46

## 2018-12-18 RX ADMIN — MEMANTINE 10 MG: 5 TABLET ORAL at 21:13

## 2018-12-18 RX ADMIN — CARVEDILOL 3.12 MG: 3.12 TABLET, FILM COATED ORAL at 21:13

## 2018-12-18 RX ADMIN — SODIUM CHLORIDE 1000 ML: 9 INJECTION, SOLUTION INTRAVENOUS at 12:14

## 2018-12-18 RX ADMIN — MONTELUKAST SODIUM 10 MG: 10 TABLET, FILM COATED ORAL at 21:13

## 2018-12-18 NOTE — H&P
Procedure Laterality Date    ANGIOPLASTY  2002 ?  SKIN BIOPSY  07/19/217    2 spots removed        Medications Prior to Admission:    Prior to Admission medications    Medication Sig Start Date End Date Taking? Authorizing Provider   Acetaminophen (ARTHRITIS PAIN RELIEF PO) Take by mouth    Historical Provider, MD   zoster recombinant adjuvanted vaccine (SHINGRIX) 50 MCG SUSR injection 50 MCG IM then repeat 2-6 months. 10/16/18 10/16/19  JANA Henderson - CNP   carvedilol (COREG) 3.125 MG tablet Take 1 tablet by mouth 2 times daily 10/16/18   JANA Henderson - CNP   spironolactone (ALDACTONE) 25 MG tablet Take 1 tablet by mouth daily 10/16/18   JANA Henderson - CNP   simvastatin (ZOCOR) 40 MG tablet TAKE ONE TABLET BY MOUTH AT BEDTIME 8/23/18   Caesar Pale, DO   citalopram (CELEXA) 10 MG tablet TAKE 1 TABLET BY MOUTH DAILY 8/23/18   Caesar Pale, DO   olmesartan (BENICAR) 20 MG tablet Take 1 tablet by mouth daily 8/23/18   Caesar Pale, DO   montelukast (SINGULAIR) 10 MG tablet Take 1 tablet by mouth nightly For sinus / chest 8/23/18   Caesar Pale, DO   memantine ER (NAMENDA XR) 28 MG CP24 extended release capsule TAKE 1 CAPSULE BY MOUTH DAILY 8/23/18   Caesar Pale, DO   omeprazole (PRILOSEC) 20 MG delayed release capsule TAKE 1 CAPSULE BY MOUTH DAILY FOR STOMACH ACID 8/23/18   Caesar Pale, DO   Multiple Vitamins-Minerals (VITABASIC COMPLETE PO) Take by mouth    Historical Provider, MD   Simethicone (GAS RELIEF 125 MAX ST PO) Take by mouth daily    Historical Provider, MD   NITROSTAT 0.4 MG SL tablet PLACE 1 TABLET UNDER TONGUE EVERY 5 MINUTES AS NEEDED FOR CHEST PAIN 6/15/16   Caesar Pale, DO   calcium carbonate (OSCAL) 500 MG TABS tablet Take 1,000 mg by mouth 2 times daily. Historical Provider, MD   Cholecalciferol (VITAMIN D3) 1000 UNITS CAPS Take 1 capsule by mouth daily. Historical Provider, MD   aspirin 81 MG tablet Take 81 mg by mouth daily. EMR. Every effort was made to ensure accuracy; However, inadvertent computerized transcription errors may be present. Thank you Shruthi Rivera DO for the opportunity to be involved in this patient's care. If you have any questions or concerns please feel free to contact me at [phone].

## 2018-12-18 NOTE — ED PROVIDER NOTES
found       I spoke with Dr. Ken Holbrook. We thoroughly discussed the history, physical exam, laboratory and imaging studies, as well as, emergency department course. Based upon that discussion, we've decided to admit Melia Birmingham for further observation and evaluation of Ronnie Kerns's chest pain. As I have deemed necessary from their history, physical, and studies, I have considered and evaluated Melia Birmingham for the following diagnoses:     FINAL IMPRESSION  1. Generalized weakness    2. Urinary tract infection without hematuria, site unspecified    3. Dehydration    4. Chest pain, unspecified type        Vitals:  Blood pressure (!) 141/65, pulse 75, temperature 98.4 °F (36.9 °C), temperature source Oral, resp. rate 18, SpO2 96 %, not currently breastfeeding. DISPOSITION  Patient was admitted in stable condition.           JANA Lincoln - JEROME  12/18/18 0033

## 2018-12-19 LAB
ANION GAP SERPL CALCULATED.3IONS-SCNC: 11 MMOL/L (ref 3–16)
BUN BLDV-MCNC: 19 MG/DL (ref 7–20)
CALCIUM SERPL-MCNC: 9.2 MG/DL (ref 8.3–10.6)
CHLORIDE BLD-SCNC: 103 MMOL/L (ref 99–110)
CHOLESTEROL, TOTAL: 108 MG/DL (ref 0–199)
CO2: 25 MMOL/L (ref 21–32)
CREAT SERPL-MCNC: 0.9 MG/DL (ref 0.6–1.2)
EKG ATRIAL RATE: 75 BPM
EKG ATRIAL RATE: 76 BPM
EKG DIAGNOSIS: NORMAL
EKG DIAGNOSIS: NORMAL
EKG P AXIS: 69 DEGREES
EKG P AXIS: 78 DEGREES
EKG P-R INTERVAL: 160 MS
EKG P-R INTERVAL: 160 MS
EKG Q-T INTERVAL: 368 MS
EKG Q-T INTERVAL: 370 MS
EKG QRS DURATION: 76 MS
EKG QRS DURATION: 82 MS
EKG QTC CALCULATION (BAZETT): 413 MS
EKG QTC CALCULATION (BAZETT): 414 MS
EKG R AXIS: 32 DEGREES
EKG R AXIS: 32 DEGREES
EKG T AXIS: 45 DEGREES
EKG T AXIS: 58 DEGREES
EKG VENTRICULAR RATE: 75 BPM
EKG VENTRICULAR RATE: 76 BPM
GFR AFRICAN AMERICAN: >60
GFR NON-AFRICAN AMERICAN: 60
GLUCOSE BLD-MCNC: 94 MG/DL (ref 70–99)
HCT VFR BLD CALC: 30.9 % (ref 36–48)
HDLC SERPL-MCNC: 37 MG/DL (ref 40–60)
HEMOGLOBIN: 10.3 G/DL (ref 12–16)
LDL CHOLESTEROL CALCULATED: 50 MG/DL
MCH RBC QN AUTO: 30.8 PG (ref 26–34)
MCHC RBC AUTO-ENTMCNC: 33.1 G/DL (ref 31–36)
MCV RBC AUTO: 93 FL (ref 80–100)
PDW BLD-RTO: 13.2 % (ref 12.4–15.4)
PLATELET # BLD: 176 K/UL (ref 135–450)
PMV BLD AUTO: 9 FL (ref 5–10.5)
POTASSIUM REFLEX MAGNESIUM: 4.7 MMOL/L (ref 3.5–5.1)
RBC # BLD: 3.33 M/UL (ref 4–5.2)
SODIUM BLD-SCNC: 139 MMOL/L (ref 136–145)
TRIGL SERPL-MCNC: 106 MG/DL (ref 0–150)
VLDLC SERPL CALC-MCNC: 21 MG/DL
WBC # BLD: 7.2 K/UL (ref 4–11)

## 2018-12-19 PROCEDURE — 2580000003 HC RX 258: Performed by: INTERNAL MEDICINE

## 2018-12-19 PROCEDURE — 97163 PT EVAL HIGH COMPLEX 45 MIN: CPT

## 2018-12-19 PROCEDURE — G8979 MOBILITY GOAL STATUS: HCPCS

## 2018-12-19 PROCEDURE — 97116 GAIT TRAINING THERAPY: CPT

## 2018-12-19 PROCEDURE — 80048 BASIC METABOLIC PNL TOTAL CA: CPT

## 2018-12-19 PROCEDURE — G0378 HOSPITAL OBSERVATION PER HR: HCPCS

## 2018-12-19 PROCEDURE — 6360000002 HC RX W HCPCS: Performed by: INTERNAL MEDICINE

## 2018-12-19 PROCEDURE — 97530 THERAPEUTIC ACTIVITIES: CPT

## 2018-12-19 PROCEDURE — 85027 COMPLETE CBC AUTOMATED: CPT

## 2018-12-19 PROCEDURE — G8988 SELF CARE GOAL STATUS: HCPCS

## 2018-12-19 PROCEDURE — 6370000000 HC RX 637 (ALT 250 FOR IP)

## 2018-12-19 PROCEDURE — 93010 ELECTROCARDIOGRAM REPORT: CPT | Performed by: INTERNAL MEDICINE

## 2018-12-19 PROCEDURE — G8987 SELF CARE CURRENT STATUS: HCPCS

## 2018-12-19 PROCEDURE — 6370000000 HC RX 637 (ALT 250 FOR IP): Performed by: INTERNAL MEDICINE

## 2018-12-19 PROCEDURE — 1200000000 HC SEMI PRIVATE

## 2018-12-19 PROCEDURE — 36415 COLL VENOUS BLD VENIPUNCTURE: CPT

## 2018-12-19 PROCEDURE — 96366 THER/PROPH/DIAG IV INF ADDON: CPT

## 2018-12-19 PROCEDURE — 87086 URINE CULTURE/COLONY COUNT: CPT

## 2018-12-19 PROCEDURE — 80061 LIPID PANEL: CPT

## 2018-12-19 PROCEDURE — 96372 THER/PROPH/DIAG INJ SC/IM: CPT

## 2018-12-19 PROCEDURE — 97167 OT EVAL HIGH COMPLEX 60 MIN: CPT

## 2018-12-19 PROCEDURE — G8978 MOBILITY CURRENT STATUS: HCPCS

## 2018-12-19 RX ORDER — SODIUM CHLORIDE 9 MG/ML
INJECTION, SOLUTION INTRAVENOUS CONTINUOUS
Status: DISPENSED | OUTPATIENT
Start: 2018-12-19 | End: 2018-12-19

## 2018-12-19 RX ADMIN — ENOXAPARIN SODIUM 40 MG: 40 INJECTION SUBCUTANEOUS at 08:58

## 2018-12-19 RX ADMIN — CARVEDILOL 3.12 MG: 3.12 TABLET, FILM COATED ORAL at 08:57

## 2018-12-19 RX ADMIN — ASPIRIN 81 MG: 81 TABLET, COATED ORAL at 08:57

## 2018-12-19 RX ADMIN — SODIUM CHLORIDE: 9 INJECTION, SOLUTION INTRAVENOUS at 12:32

## 2018-12-19 RX ADMIN — Medication 10 ML: at 10:02

## 2018-12-19 RX ADMIN — Medication: at 10:02

## 2018-12-19 RX ADMIN — MEMANTINE 10 MG: 5 TABLET ORAL at 08:57

## 2018-12-19 RX ADMIN — CEFTRIAXONE SODIUM 1 G: 1 INJECTION, POWDER, FOR SOLUTION INTRAMUSCULAR; INTRAVENOUS at 09:02

## 2018-12-19 RX ADMIN — CARVEDILOL 3.12 MG: 3.12 TABLET, FILM COATED ORAL at 20:37

## 2018-12-19 RX ADMIN — PANTOPRAZOLE SODIUM 40 MG: 40 TABLET, DELAYED RELEASE ORAL at 06:22

## 2018-12-19 RX ADMIN — SODIUM CHLORIDE: 9 INJECTION, SOLUTION INTRAVENOUS at 06:20

## 2018-12-19 RX ADMIN — CITALOPRAM HYDROBROMIDE 10 MG: 20 TABLET ORAL at 08:56

## 2018-12-19 RX ADMIN — SODIUM CHLORIDE: 9 INJECTION, SOLUTION INTRAVENOUS at 20:58

## 2018-12-19 RX ADMIN — Medication 10 ML: at 20:38

## 2018-12-19 RX ADMIN — ATORVASTATIN CALCIUM 40 MG: 40 TABLET, FILM COATED ORAL at 20:37

## 2018-12-19 RX ADMIN — MEMANTINE 10 MG: 5 TABLET ORAL at 20:37

## 2018-12-19 RX ADMIN — VITAMIN D, TAB 1000IU (100/BT) 1000 UNITS: 25 TAB at 08:56

## 2018-12-19 RX ADMIN — MONTELUKAST SODIUM 10 MG: 10 TABLET, FILM COATED ORAL at 20:37

## 2018-12-19 NOTE — PROGRESS NOTES
Patient is awake, alert and oriented to person, disoriented x3. Assessment is complete, see flow sheet. Bed in lowest position, wheels locked, call light is within reach. Patient denies any further needs at the moment. Will continue to monitor.
to auscultation, bilaterally without Rales/Wheezes/Rhonchi. Cardiovascular:  Regular rate and rhythm with normal S1/S2 without murmurs, rubs or gallops. Abdomen: Soft, non-tender, non-distended with normal bowel sounds. Musculoskeletal:  No clubbing, cyanosis or edema bilaterally. Full range of motion without deformity. Skin: Skin color, texture, turgor normal.  No rashes or lesions. Neurologic:  Neurovascularly intact without any focal sensory/motor deficits. Cranial nerves: II-XII intact, grossly non-focal.  Psychiatric:  Alert and oriented, thought content appropriate, normal insight  Capillary Refill: Brisk,< 3 seconds   Peripheral Pulses: +2 palpable, equal bilaterally     Labs:   Recent Labs      12/18/18   1100  12/19/18   0808   WBC  7.0  7.2   HGB  11.2*  10.3*   HCT  33.6*  30.9*   PLT  206  176     Recent Labs      12/18/18   1100  12/19/18   0808   NA  139  139   K  5.1  4.7   CL  100  103   CO2  28  25   BUN  27*  19   CREATININE  1.2  0.9   CALCIUM  9.6  9.2     Recent Labs      12/18/18   1100   AST  18   ALT  11   BILITOT  0.3   ALKPHOS  60       Recent Labs      12/18/18   1100  12/18/18   1629  12/18/18   1949   Rosa Bienvenido  <0.01  <0.01  <0.01       Urinalysis:    Lab Results   Component Value Date    NITRU Negative 12/18/2018    WBCUA  12/18/2018    BACTERIA 2+ 12/18/2018    RBCUA 0-2 12/18/2018    BLOODU Negative 12/18/2018    SPECGRAV 1.010 12/18/2018    GLUCOSEU Negative 12/18/2018       Radiology:  XR CHEST STANDARD (2 VW)   Final Result   1. Low lung volumes but no active pulmonary disease. Assessment/Plan:  Active Hospital Problems    Diagnosis Date Noted    Acute cystitis without hematuria [N30.00] 12/18/2018     1.  Generalized Weakness in the setting of Acute Cystitis W/o Hematuria - as evidenced on UA on admission   - Received IV Rocephin ×1 in ED ; continue pending urine cultures   - Patient appeared  dehydrated on admission  - Improving with   IV fluids 100 mL/h

## 2018-12-20 VITALS
RESPIRATION RATE: 18 BRPM | TEMPERATURE: 98.3 F | HEIGHT: 62 IN | SYSTOLIC BLOOD PRESSURE: 115 MMHG | WEIGHT: 139.99 LBS | OXYGEN SATURATION: 96 % | HEART RATE: 89 BPM | DIASTOLIC BLOOD PRESSURE: 66 MMHG | BODY MASS INDEX: 25.76 KG/M2

## 2018-12-20 LAB
ANION GAP SERPL CALCULATED.3IONS-SCNC: 12 MMOL/L (ref 3–16)
BUN BLDV-MCNC: 15 MG/DL (ref 7–20)
CALCIUM SERPL-MCNC: 9.1 MG/DL (ref 8.3–10.6)
CHLORIDE BLD-SCNC: 105 MMOL/L (ref 99–110)
CO2: 25 MMOL/L (ref 21–32)
CREAT SERPL-MCNC: 1 MG/DL (ref 0.6–1.2)
GFR AFRICAN AMERICAN: >60
GFR NON-AFRICAN AMERICAN: 53
GLUCOSE BLD-MCNC: 97 MG/DL (ref 70–99)
POTASSIUM REFLEX MAGNESIUM: 4.4 MMOL/L (ref 3.5–5.1)
SODIUM BLD-SCNC: 142 MMOL/L (ref 136–145)
URINE CULTURE, ROUTINE: NORMAL

## 2018-12-20 PROCEDURE — 6370000000 HC RX 637 (ALT 250 FOR IP): Performed by: INTERNAL MEDICINE

## 2018-12-20 PROCEDURE — 97110 THERAPEUTIC EXERCISES: CPT

## 2018-12-20 PROCEDURE — 97530 THERAPEUTIC ACTIVITIES: CPT

## 2018-12-20 PROCEDURE — 96366 THER/PROPH/DIAG IV INF ADDON: CPT

## 2018-12-20 PROCEDURE — 2580000003 HC RX 258: Performed by: INTERNAL MEDICINE

## 2018-12-20 PROCEDURE — 96372 THER/PROPH/DIAG INJ SC/IM: CPT

## 2018-12-20 PROCEDURE — 80048 BASIC METABOLIC PNL TOTAL CA: CPT

## 2018-12-20 PROCEDURE — G0378 HOSPITAL OBSERVATION PER HR: HCPCS

## 2018-12-20 PROCEDURE — 36415 COLL VENOUS BLD VENIPUNCTURE: CPT

## 2018-12-20 PROCEDURE — G8987 SELF CARE CURRENT STATUS: HCPCS

## 2018-12-20 PROCEDURE — 97535 SELF CARE MNGMENT TRAINING: CPT

## 2018-12-20 PROCEDURE — 6360000002 HC RX W HCPCS: Performed by: INTERNAL MEDICINE

## 2018-12-20 RX ORDER — BENZONATATE 100 MG/1
100 CAPSULE ORAL 3 TIMES DAILY PRN
Status: DISCONTINUED | OUTPATIENT
Start: 2018-12-20 | End: 2018-12-20 | Stop reason: HOSPADM

## 2018-12-20 RX ORDER — SODIUM CHLORIDE 9 MG/ML
INJECTION, SOLUTION INTRAVENOUS
Status: COMPLETED
Start: 2018-12-20 | End: 2018-12-20

## 2018-12-20 RX ORDER — BENZONATATE 100 MG/1
100 CAPSULE ORAL 3 TIMES DAILY PRN
Qty: 21 CAPSULE | Refills: 0 | Status: SHIPPED | OUTPATIENT
Start: 2018-12-20 | End: 2019-02-18 | Stop reason: SDUPTHER

## 2018-12-20 RX ORDER — CEFUROXIME AXETIL 500 MG/1
500 TABLET ORAL 2 TIMES DAILY
Qty: 6 TABLET | Refills: 0 | Status: SHIPPED | OUTPATIENT
Start: 2018-12-20 | End: 2018-12-23

## 2018-12-20 RX ADMIN — VITAMIN D, TAB 1000IU (100/BT) 1000 UNITS: 25 TAB at 09:55

## 2018-12-20 RX ADMIN — CEFTRIAXONE SODIUM 1 G: 1 INJECTION, POWDER, FOR SOLUTION INTRAMUSCULAR; INTRAVENOUS at 08:20

## 2018-12-20 RX ADMIN — Medication 10 ML: at 08:20

## 2018-12-20 RX ADMIN — PANTOPRAZOLE SODIUM 40 MG: 40 TABLET, DELAYED RELEASE ORAL at 05:46

## 2018-12-20 RX ADMIN — CARVEDILOL 3.12 MG: 3.12 TABLET, FILM COATED ORAL at 09:56

## 2018-12-20 RX ADMIN — ASPIRIN 81 MG: 81 TABLET, COATED ORAL at 09:55

## 2018-12-20 RX ADMIN — ENOXAPARIN SODIUM 40 MG: 40 INJECTION SUBCUTANEOUS at 09:56

## 2018-12-20 RX ADMIN — MEMANTINE 10 MG: 5 TABLET ORAL at 09:55

## 2018-12-20 RX ADMIN — CITALOPRAM HYDROBROMIDE 10 MG: 20 TABLET ORAL at 09:56

## 2018-12-20 ASSESSMENT — PAIN SCALES - GENERAL
PAINLEVEL_OUTOF10: 0

## 2018-12-20 NOTE — DISCHARGE SUMMARY
Hospital Medicine Discharge Summary    Patient ID: Miguel Kessler      Patient's PCP: Boris Dc DO    Admit Date: 12/18/2018     Discharge Date:  12/20/18    Admitting Physician: Martin Prakash MD     Discharge Physician: Martin Prakash MD     Discharge Diagnoses: Active Hospital Problems    Diagnosis Date Noted    Acute cystitis without hematuria [N30.00] 12/18/2018       The patient was seen and examined on day of discharge and this discharge summary is in conjunction with any daily progress note from day of discharge. History Of Present Illness:   80 y.o. female with PMH of Mild Dementia , HTN , HLD and Bilateral HIP OA who presented to [facility] ED brought by family  with complaints of Increasing Fatigue and Dysuria with Frequency . patient reports that she is not feeling her normal self for the last few days . She denies any fever, chills, nausea, vomiting, diarrhea  . Reports getting more weaker every day . So she was brought to the ED for further evaluation and management  . Patient lives with her son , who was at bedside and assisted patient in providing history  . As per son patient has been living with him for the past 3 months . She was last seen by her PCP and got an x-ray of the hip done on 12/7/2018 which showed no acute fracture but bilateral hip osteoarthrosis. Patient also reports \" fluttering in the chest\" which happened twice today but denies any diaphoresis, shortness of breath, lightheadedness, dizziness, orthopnea, PND     ED course : Patient had basic labs in ED which were unremarkable . UA suggestive of moderate leukocyte esterase with  WBC and 2+ bacteria . Initial troponin and EKG were unremarkable  . Patient given IV Rocephin ×1 along with aspirin for chest discomfort and is being admitted for further evaluation and management        Hospital Course: By problem List   1.  Generalized Weakness in the setting of Acute Cystitis W/o Hematuria - as evidenced on UA on admission   - Received IV Rocephin ×1 in ED ; continued pending urine cultures - Urine Cultures showed No Growth  - D/eugene on CEFTIN 500 mg BID x 3 more days    - Patient appeared  dehydrated  - improved with IV hydration  ; held home dose of Aldactone 25 mg daily and D/eugene   - PT/OT evaluated with Recs to [place] [id number]      2. Hypertension - optimal control on Coreg and Benicar at home; resumed       3. Atypical chest pain - presenting as chest flutter ; initial EKG and troponin ×3  negative; Resume aspirin; nitro SL when necessary     4. HLD - Continue statin      5. Mild Alzheimer;s dementia - Stable on home meds ; continue      Patient discharged home with home care in stable medical condition       Physical Exam Performed:   /62   Pulse 89   Temp 98.4 °F (36.9 °C) (Oral)   Resp 16   Ht 5' 2\" (1.575 m)   Wt 139 lb 15.9 oz (63.5 kg)   SpO2 96%   BMI 25.60 kg/m²     General appearance: Elderly female in   No apparent distress, appears stated age and cooperative. HEENT:  Normal cephalic, atraumatic without obvious deformity. Pupils equal, round, and reactive to light. Extra ocular muscles intact. Conjunctivae/corneas clear. Neck: Supple, with full range of motion. No jugular venous distention. Trachea midline. Respiratory:  Normal respiratory effort. Clear to auscultation, bilaterally without Rales/Wheezes/Rhonchi. Cardiovascular:  Regular rate and rhythm with normal S1/S2 without murmurs, rubs or gallops. Abdomen: Soft, non-tender, non-distended with normal bowel sounds. Musculoskeletal:  No clubbing, cyanosis or edema bilaterally. Full range of motion without deformity. Skin: Skin color, texture, turgor normal.  No rashes or lesions. Neurologic:  Neurovascularly intact without any focal sensory/motor deficits.  Cranial nerves: II-XII intact, grossly non-focal.  Psychiatric:  Alert and oriented, thought content appropriate, normal insight  Capillary Refill: Brisk,< (NAMENDA XR) 28 MG CP24 extended release capsule TAKE 1 CAPSULE BY MOUTH DAILY  Qty: 30 capsule, Refills: 11      omeprazole (PRILOSEC) 20 MG delayed release capsule TAKE 1 CAPSULE BY MOUTH DAILY FOR STOMACH ACID  Qty: 30 capsule, Refills: 6      Multiple Vitamins-Minerals (VITABASIC COMPLETE PO) Take by mouth      Simethicone (GAS RELIEF 125 MAX ST PO) Take by mouth daily      calcium carbonate (OSCAL) 500 MG TABS tablet Take 1,000 mg by mouth 2 times daily. Cholecalciferol (VITAMIN D3) 1000 UNITS CAPS Take 1 capsule by mouth daily. aspirin 81 MG tablet Take 81 mg by mouth daily. zoster recombinant adjuvanted vaccine (SHINGRIX) 50 MCG SUSR injection 50 MCG IM then repeat 2-6 months. Qty: 0.5 mL, Refills: 1    Associated Diagnoses: Need for prophylactic vaccination and inoculation against varicella      montelukast (SINGULAIR) 10 MG tablet Take 1 tablet by mouth nightly For sinus / chest  Qty: 30 tablet, Refills: 5      NITROSTAT 0.4 MG SL tablet PLACE 1 TABLET UNDER TONGUE EVERY 5 MINUTES AS NEEDED FOR CHEST PAIN  Qty: 25 tablet, Refills: 3    Comments: RX HAS EXP. Time Spent on discharge is more than 30 minutes in the examination, evaluation, counseling and review of medications and discharge plan. Signed:    Edmond Castle MD   12/20/2018      Thank you Nestor Reyes DO for the opportunity to be involved in this patient's care. If you have any questions or concerns please feel free to contact me at [phone].

## 2018-12-20 NOTE — CARE COORDINATION
[facility]  Pt aware of HHC with [facility]. Orders faxed to [facility].     Gio Ma RN CTN with [facility] Yanez [address]  CLM:[phone]

## 2018-12-20 NOTE — PLAN OF CARE
Problem: Nutrition  Goal: Optimal nutrition therapy  Outcome: Ongoing  Nutrition Problem: Inadequate oral intake  Intervention: Food and/or Nutrient Delivery: Continue current diet, Continue current ONS  Nutritional Goals: Pt will have intakes 50% or greater this admission

## 2018-12-21 ENCOUNTER — TELEPHONE (OUTPATIENT)
Dept: FAMILY MEDICINE CLINIC | Age: 83
End: 2018-12-21

## 2018-12-21 DIAGNOSIS — N39.0 RECURRENT URINARY TRACT INFECTION: Primary | ICD-10-CM

## 2019-01-04 DIAGNOSIS — R06.01 ORTHOPNEA: ICD-10-CM

## 2019-01-04 RX ORDER — CARVEDILOL 3.12 MG/1
3.12 TABLET ORAL 2 TIMES DAILY
Qty: 180 TABLET | Refills: 1 | Status: SHIPPED | OUTPATIENT
Start: 2019-01-04

## 2019-01-04 RX ORDER — CITALOPRAM 10 MG/1
TABLET ORAL
Qty: 30 TABLET | Refills: 5 | Status: SHIPPED | OUTPATIENT
Start: 2019-01-04

## 2019-01-04 RX ORDER — SPIRONOLACTONE 25 MG/1
25 TABLET ORAL DAILY
Qty: 90 TABLET | Refills: 1 | Status: SHIPPED | OUTPATIENT
Start: 2019-01-04

## 2019-01-04 RX ORDER — SIMVASTATIN 40 MG
TABLET ORAL
Qty: 30 TABLET | Refills: 5 | Status: SHIPPED | OUTPATIENT
Start: 2019-01-04

## 2019-01-04 RX ORDER — OMEPRAZOLE 20 MG/1
CAPSULE, DELAYED RELEASE ORAL
Qty: 30 CAPSULE | Refills: 6 | Status: SHIPPED | OUTPATIENT
Start: 2019-01-04

## 2019-01-04 RX ORDER — OLMESARTAN MEDOXOMIL 20 MG/1
20 TABLET ORAL DAILY
Qty: 30 TABLET | Refills: 5 | Status: SHIPPED | OUTPATIENT
Start: 2019-01-04

## 2019-01-18 ENCOUNTER — TELEPHONE (OUTPATIENT)
Dept: FAMILY MEDICINE CLINIC | Age: 84
End: 2019-01-18

## 2019-01-18 DIAGNOSIS — N39.0 RECURRENT UTI: Primary | ICD-10-CM

## 2019-02-04 ENCOUNTER — OFFICE VISIT (OUTPATIENT)
Dept: FAMILY MEDICINE CLINIC | Age: 84
End: 2019-02-04
Payer: COMMERCIAL

## 2019-02-04 VITALS
SYSTOLIC BLOOD PRESSURE: 106 MMHG | HEART RATE: 77 BPM | OXYGEN SATURATION: 99 % | WEIGHT: 137 LBS | HEIGHT: 62 IN | DIASTOLIC BLOOD PRESSURE: 56 MMHG | BODY MASS INDEX: 25.21 KG/M2

## 2019-02-04 DIAGNOSIS — F02.80 LATE ONSET ALZHEIMER'S DISEASE WITHOUT BEHAVIORAL DISTURBANCE (HCC): ICD-10-CM

## 2019-02-04 DIAGNOSIS — Z23 NEED FOR PNEUMOCOCCAL VACCINATION: ICD-10-CM

## 2019-02-04 DIAGNOSIS — G30.1 LATE ONSET ALZHEIMER'S DISEASE WITHOUT BEHAVIORAL DISTURBANCE (HCC): ICD-10-CM

## 2019-02-04 DIAGNOSIS — R35.0 URINE FREQUENCY: Primary | ICD-10-CM

## 2019-02-04 LAB
BILIRUBIN, POC: NORMAL
BLOOD URINE, POC: NORMAL
CLARITY, POC: NORMAL
COLOR, POC: NORMAL
GLUCOSE URINE, POC: NORMAL
KETONES, POC: NORMAL
LEUKOCYTE EST, POC: NORMAL
NITRITE, POC: NORMAL
PH, POC: 6
PROTEIN, POC: NORMAL
SPECIFIC GRAVITY, POC: 1.01
UROBILINOGEN, POC: 0.2

## 2019-02-04 PROCEDURE — 90670 PCV13 VACCINE IM: CPT | Performed by: FAMILY MEDICINE

## 2019-02-04 PROCEDURE — 90471 IMMUNIZATION ADMIN: CPT | Performed by: FAMILY MEDICINE

## 2019-02-04 PROCEDURE — 99213 OFFICE O/P EST LOW 20 MIN: CPT | Performed by: FAMILY MEDICINE

## 2019-02-04 PROCEDURE — 81002 URINALYSIS NONAUTO W/O SCOPE: CPT | Performed by: FAMILY MEDICINE

## 2019-02-04 ASSESSMENT — ENCOUNTER SYMPTOMS
DIARRHEA: 0
ABDOMINAL PAIN: 0
COUGH: 0
SHORTNESS OF BREATH: 0
CONSTIPATION: 0

## 2019-02-18 RX ORDER — BENZONATATE 100 MG/1
100 CAPSULE ORAL 3 TIMES DAILY PRN
Qty: 21 CAPSULE | Refills: 2 | Status: SHIPPED | OUTPATIENT
Start: 2019-02-18 | End: 2019-02-25

## 2019-04-15 ENCOUNTER — APPOINTMENT (OUTPATIENT)
Dept: GENERAL RADIOLOGY | Age: 84
End: 2019-04-15
Payer: MEDICARE

## 2019-04-15 ENCOUNTER — HOSPITAL ENCOUNTER (OUTPATIENT)
Age: 84
Setting detail: OBSERVATION
Discharge: HOME | End: 2019-04-19
Attending: EMERGENCY MEDICINE | Admitting: INTERNAL MEDICINE
Payer: MEDICARE

## 2019-04-15 DIAGNOSIS — R53.81 MALAISE AND FATIGUE: ICD-10-CM

## 2019-04-15 DIAGNOSIS — E86.0 DEHYDRATION: Primary | ICD-10-CM

## 2019-04-15 DIAGNOSIS — R53.83 MALAISE AND FATIGUE: ICD-10-CM

## 2019-04-15 DIAGNOSIS — R19.7 DIARRHEA, UNSPECIFIED TYPE: ICD-10-CM

## 2019-04-15 LAB
A/G RATIO: 1.5 (ref 1.1–2.2)
ALBUMIN SERPL-MCNC: 4.1 G/DL (ref 3.4–5)
ALP BLD-CCNC: 53 U/L (ref 40–129)
ALT SERPL-CCNC: 8 U/L (ref 10–40)
ANION GAP SERPL CALCULATED.3IONS-SCNC: 11 MMOL/L (ref 3–16)
AST SERPL-CCNC: 17 U/L (ref 15–37)
BASOPHILS ABSOLUTE: 0.1 K/UL (ref 0–0.2)
BASOPHILS RELATIVE PERCENT: 0.9 %
BILIRUB SERPL-MCNC: 0.3 MG/DL (ref 0–1)
BILIRUBIN URINE: NEGATIVE
BLOOD, URINE: NEGATIVE
BUN BLDV-MCNC: 21 MG/DL (ref 7–20)
CALCIUM SERPL-MCNC: 9.2 MG/DL (ref 8.3–10.6)
CHLORIDE BLD-SCNC: 97 MMOL/L (ref 99–110)
CLARITY: CLEAR
CO2: 27 MMOL/L (ref 21–32)
COLOR: YELLOW
CREAT SERPL-MCNC: 1.3 MG/DL (ref 0.6–1.2)
EOSINOPHILS ABSOLUTE: 0.4 K/UL (ref 0–0.6)
EOSINOPHILS RELATIVE PERCENT: 3.6 %
GFR AFRICAN AMERICAN: 47
GFR NON-AFRICAN AMERICAN: 39
GLOBULIN: 2.8 G/DL
GLUCOSE BLD-MCNC: 103 MG/DL (ref 70–99)
GLUCOSE URINE: NEGATIVE MG/DL
HCT VFR BLD CALC: 31.1 % (ref 36–48)
HEMOGLOBIN: 10.6 G/DL (ref 12–16)
KETONES, URINE: NEGATIVE MG/DL
LACTIC ACID: 0.8 MMOL/L (ref 0.4–2)
LEUKOCYTE ESTERASE, URINE: NEGATIVE
LYMPHOCYTES ABSOLUTE: 1.4 K/UL (ref 1–5.1)
LYMPHOCYTES RELATIVE PERCENT: 13.8 %
MAGNESIUM: 2.2 MG/DL (ref 1.8–2.4)
MCH RBC QN AUTO: 31.7 PG (ref 26–34)
MCHC RBC AUTO-ENTMCNC: 34 G/DL (ref 31–36)
MCV RBC AUTO: 93 FL (ref 80–100)
MICROSCOPIC EXAMINATION: NORMAL
MONOCYTES ABSOLUTE: 0.8 K/UL (ref 0–1.3)
MONOCYTES RELATIVE PERCENT: 8.1 %
NEUTROPHILS ABSOLUTE: 7.5 K/UL (ref 1.7–7.7)
NEUTROPHILS RELATIVE PERCENT: 73.6 %
NITRITE, URINE: NEGATIVE
PDW BLD-RTO: 13.4 % (ref 12.4–15.4)
PH UA: 6.5 (ref 5–8)
PLATELET # BLD: 194 K/UL (ref 135–450)
PMV BLD AUTO: 9.3 FL (ref 5–10.5)
POTASSIUM SERPL-SCNC: 5.3 MMOL/L (ref 3.5–5.1)
PROTEIN UA: NEGATIVE MG/DL
RBC # BLD: 3.34 M/UL (ref 4–5.2)
SODIUM BLD-SCNC: 135 MMOL/L (ref 136–145)
SPECIFIC GRAVITY UA: 1.01 (ref 1–1.03)
TOTAL PROTEIN: 6.9 G/DL (ref 6.4–8.2)
URINE REFLEX TO CULTURE: NORMAL
URINE TYPE: NORMAL
UROBILINOGEN, URINE: 0.2 E.U./DL
WBC # BLD: 10.2 K/UL (ref 4–11)

## 2019-04-15 PROCEDURE — 71045 X-RAY EXAM CHEST 1 VIEW: CPT

## 2019-04-15 PROCEDURE — 99285 EMERGENCY DEPT VISIT HI MDM: CPT

## 2019-04-15 PROCEDURE — 83735 ASSAY OF MAGNESIUM: CPT

## 2019-04-15 PROCEDURE — 83605 ASSAY OF LACTIC ACID: CPT

## 2019-04-15 PROCEDURE — 81003 URINALYSIS AUTO W/O SCOPE: CPT

## 2019-04-15 PROCEDURE — 85025 COMPLETE CBC W/AUTO DIFF WBC: CPT

## 2019-04-15 PROCEDURE — 80053 COMPREHEN METABOLIC PANEL: CPT

## 2019-04-15 PROCEDURE — 93005 ELECTROCARDIOGRAM TRACING: CPT | Performed by: EMERGENCY MEDICINE

## 2019-04-15 RX ORDER — 0.9 % SODIUM CHLORIDE 0.9 %
500 INTRAVENOUS SOLUTION INTRAVENOUS ONCE
Status: COMPLETED | OUTPATIENT
Start: 2019-04-15 | End: 2019-04-16

## 2019-04-15 NOTE — ED NOTES
Bed: 31  Expected date:   Expected time:   Means of arrival:   Comments:  Trey Fang RN  04/15/19 1958

## 2019-04-16 LAB
BASOPHILS ABSOLUTE: 0.1 K/UL (ref 0–0.2)
BASOPHILS RELATIVE PERCENT: 0.6 %
EOSINOPHILS ABSOLUTE: 0.4 K/UL (ref 0–0.6)
EOSINOPHILS RELATIVE PERCENT: 4 %
HCT VFR BLD CALC: 28.5 % (ref 36–48)
HEMOGLOBIN: 9.8 G/DL (ref 12–16)
LYMPHOCYTES ABSOLUTE: 1.6 K/UL (ref 1–5.1)
LYMPHOCYTES RELATIVE PERCENT: 16.8 %
MCH RBC QN AUTO: 31.7 PG (ref 26–34)
MCHC RBC AUTO-ENTMCNC: 34.5 G/DL (ref 31–36)
MCV RBC AUTO: 91.9 FL (ref 80–100)
MONOCYTES ABSOLUTE: 0.8 K/UL (ref 0–1.3)
MONOCYTES RELATIVE PERCENT: 8.4 %
NEUTROPHILS ABSOLUTE: 6.6 K/UL (ref 1.7–7.7)
NEUTROPHILS RELATIVE PERCENT: 70.2 %
PDW BLD-RTO: 13.5 % (ref 12.4–15.4)
PLATELET # BLD: 160 K/UL (ref 135–450)
PMV BLD AUTO: 9 FL (ref 5–10.5)
RBC # BLD: 3.1 M/UL (ref 4–5.2)
WBC # BLD: 9.4 K/UL (ref 4–11)

## 2019-04-16 PROCEDURE — 6370000000 HC RX 637 (ALT 250 FOR IP): Performed by: INTERNAL MEDICINE

## 2019-04-16 PROCEDURE — 6370000000 HC RX 637 (ALT 250 FOR IP): Performed by: NURSE PRACTITIONER

## 2019-04-16 PROCEDURE — 97530 THERAPEUTIC ACTIVITIES: CPT

## 2019-04-16 PROCEDURE — 92526 ORAL FUNCTION THERAPY: CPT

## 2019-04-16 PROCEDURE — 2580000003 HC RX 258: Performed by: EMERGENCY MEDICINE

## 2019-04-16 PROCEDURE — 2580000003 HC RX 258: Performed by: INTERNAL MEDICINE

## 2019-04-16 PROCEDURE — 85025 COMPLETE CBC W/AUTO DIFF WBC: CPT

## 2019-04-16 PROCEDURE — 92610 EVALUATE SWALLOWING FUNCTION: CPT

## 2019-04-16 PROCEDURE — 36415 COLL VENOUS BLD VENIPUNCTURE: CPT

## 2019-04-16 PROCEDURE — 97167 OT EVAL HIGH COMPLEX 60 MIN: CPT

## 2019-04-16 PROCEDURE — 96372 THER/PROPH/DIAG INJ SC/IM: CPT

## 2019-04-16 PROCEDURE — 96361 HYDRATE IV INFUSION ADD-ON: CPT

## 2019-04-16 PROCEDURE — 96360 HYDRATION IV INFUSION INIT: CPT

## 2019-04-16 PROCEDURE — G0378 HOSPITAL OBSERVATION PER HR: HCPCS

## 2019-04-16 PROCEDURE — 6360000002 HC RX W HCPCS: Performed by: NURSE PRACTITIONER

## 2019-04-16 PROCEDURE — 51798 US URINE CAPACITY MEASURE: CPT

## 2019-04-16 PROCEDURE — 97535 SELF CARE MNGMENT TRAINING: CPT

## 2019-04-16 RX ORDER — SODIUM CHLORIDE 0.9 % (FLUSH) 0.9 %
10 SYRINGE (ML) INJECTION PRN
Status: DISCONTINUED | OUTPATIENT
Start: 2019-04-16 | End: 2019-04-19 | Stop reason: HOSPADM

## 2019-04-16 RX ORDER — NITROGLYCERIN 0.4 MG/1
0.4 TABLET SUBLINGUAL EVERY 5 MIN PRN
Status: DISCONTINUED | OUTPATIENT
Start: 2019-04-16 | End: 2019-04-19 | Stop reason: HOSPADM

## 2019-04-16 RX ORDER — HEPARIN SODIUM 5000 [USP'U]/ML
5000 INJECTION, SOLUTION INTRAVENOUS; SUBCUTANEOUS EVERY 8 HOURS SCHEDULED
Status: DISCONTINUED | OUTPATIENT
Start: 2019-04-16 | End: 2019-04-19 | Stop reason: HOSPADM

## 2019-04-16 RX ORDER — SODIUM CHLORIDE, SODIUM LACTATE, POTASSIUM CHLORIDE, CALCIUM CHLORIDE 600; 310; 30; 20 MG/100ML; MG/100ML; MG/100ML; MG/100ML
INJECTION, SOLUTION INTRAVENOUS CONTINUOUS
Status: DISCONTINUED | OUTPATIENT
Start: 2019-04-16 | End: 2019-04-17

## 2019-04-16 RX ORDER — CITALOPRAM 20 MG/1
10 TABLET ORAL DAILY
Status: DISCONTINUED | OUTPATIENT
Start: 2019-04-16 | End: 2019-04-19 | Stop reason: HOSPADM

## 2019-04-16 RX ORDER — PANTOPRAZOLE SODIUM 40 MG/1
40 TABLET, DELAYED RELEASE ORAL
Status: DISCONTINUED | OUTPATIENT
Start: 2019-04-16 | End: 2019-04-19 | Stop reason: HOSPADM

## 2019-04-16 RX ORDER — ONDANSETRON 2 MG/ML
4 INJECTION INTRAMUSCULAR; INTRAVENOUS EVERY 6 HOURS PRN
Status: DISCONTINUED | OUTPATIENT
Start: 2019-04-16 | End: 2019-04-19 | Stop reason: HOSPADM

## 2019-04-16 RX ORDER — LOPERAMIDE HYDROCHLORIDE 2 MG/1
2 CAPSULE ORAL 4 TIMES DAILY PRN
Status: DISCONTINUED | OUTPATIENT
Start: 2019-04-16 | End: 2019-04-19 | Stop reason: HOSPADM

## 2019-04-16 RX ORDER — SODIUM CHLORIDE 0.9 % (FLUSH) 0.9 %
10 SYRINGE (ML) INJECTION EVERY 12 HOURS SCHEDULED
Status: DISCONTINUED | OUTPATIENT
Start: 2019-04-16 | End: 2019-04-19 | Stop reason: HOSPADM

## 2019-04-16 RX ORDER — MEMANTINE HYDROCHLORIDE 5 MG/1
5 TABLET ORAL 2 TIMES DAILY
Status: DISCONTINUED | OUTPATIENT
Start: 2019-04-16 | End: 2019-04-19 | Stop reason: HOSPADM

## 2019-04-16 RX ORDER — CARBOXYMETHYLCELLULOSE SODIUM 10 MG/ML
1 GEL OPHTHALMIC 3 TIMES DAILY
Status: DISCONTINUED | OUTPATIENT
Start: 2019-04-16 | End: 2019-04-19 | Stop reason: HOSPADM

## 2019-04-16 RX ORDER — CARVEDILOL 3.12 MG/1
3.12 TABLET ORAL 2 TIMES DAILY
Status: DISCONTINUED | OUTPATIENT
Start: 2019-04-16 | End: 2019-04-19 | Stop reason: HOSPADM

## 2019-04-16 RX ORDER — ACETAMINOPHEN 325 MG/1
650 TABLET ORAL EVERY 4 HOURS PRN
Status: DISCONTINUED | OUTPATIENT
Start: 2019-04-16 | End: 2019-04-19 | Stop reason: HOSPADM

## 2019-04-16 RX ORDER — MONTELUKAST SODIUM 10 MG/1
10 TABLET ORAL NIGHTLY
Status: DISCONTINUED | OUTPATIENT
Start: 2019-04-16 | End: 2019-04-19 | Stop reason: HOSPADM

## 2019-04-16 RX ORDER — MINERAL OIL, PETROLATUM 425; 568 MG/G; MG/G
OINTMENT OPHTHALMIC NIGHTLY
Status: DISCONTINUED | OUTPATIENT
Start: 2019-04-16 | End: 2019-04-16 | Stop reason: CLARIF

## 2019-04-16 RX ORDER — ASPIRIN 81 MG/1
81 TABLET, CHEWABLE ORAL DAILY
Status: DISCONTINUED | OUTPATIENT
Start: 2019-04-16 | End: 2019-04-19 | Stop reason: HOSPADM

## 2019-04-16 RX ADMIN — CITALOPRAM HYDROBROMIDE 10 MG: 20 TABLET ORAL at 09:04

## 2019-04-16 RX ADMIN — SODIUM CHLORIDE 500 ML: 9 INJECTION, SOLUTION INTRAVENOUS at 00:15

## 2019-04-16 RX ADMIN — CARBOXYMETHYLCELLULOSE SODIUM 1 DROP: 10 GEL OPHTHALMIC at 09:04

## 2019-04-16 RX ADMIN — HEPARIN SODIUM 5000 UNITS: 5000 INJECTION INTRAVENOUS; SUBCUTANEOUS at 06:26

## 2019-04-16 RX ADMIN — MEMANTINE 5 MG: 5 TABLET ORAL at 01:03

## 2019-04-16 RX ADMIN — Medication 10 ML: at 20:57

## 2019-04-16 RX ADMIN — ASPIRIN 81 MG 81 MG: 81 TABLET ORAL at 09:03

## 2019-04-16 RX ADMIN — CARVEDILOL 3.12 MG: 3.12 TABLET, FILM COATED ORAL at 01:03

## 2019-04-16 RX ADMIN — HEPARIN SODIUM 5000 UNITS: 5000 INJECTION INTRAVENOUS; SUBCUTANEOUS at 21:10

## 2019-04-16 RX ADMIN — MONTELUKAST 10 MG: 10 TABLET, FILM COATED ORAL at 01:03

## 2019-04-16 RX ADMIN — MEMANTINE 5 MG: 5 TABLET ORAL at 20:47

## 2019-04-16 RX ADMIN — SODIUM CHLORIDE, POTASSIUM CHLORIDE, SODIUM LACTATE AND CALCIUM CHLORIDE: 600; 310; 30; 20 INJECTION, SOLUTION INTRAVENOUS at 23:30

## 2019-04-16 RX ADMIN — SODIUM CHLORIDE, POTASSIUM CHLORIDE, SODIUM LACTATE AND CALCIUM CHLORIDE: 600; 310; 30; 20 INJECTION, SOLUTION INTRAVENOUS at 01:03

## 2019-04-16 RX ADMIN — CARVEDILOL 3.12 MG: 3.12 TABLET, FILM COATED ORAL at 09:03

## 2019-04-16 RX ADMIN — MONTELUKAST 10 MG: 10 TABLET, FILM COATED ORAL at 20:47

## 2019-04-16 RX ADMIN — CARVEDILOL 3.12 MG: 3.12 TABLET, FILM COATED ORAL at 20:47

## 2019-04-16 RX ADMIN — MEMANTINE 5 MG: 5 TABLET ORAL at 09:03

## 2019-04-16 RX ADMIN — CARBOXYMETHYLCELLULOSE SODIUM 1 DROP: 10 GEL OPHTHALMIC at 20:47

## 2019-04-16 ASSESSMENT — PAIN SCALES - GENERAL
PAINLEVEL_OUTOF10: 0

## 2019-04-16 NOTE — PROGRESS NOTES
4 Eyes Skin Assessment     The patient is being assess for  Admission    I agree that 2 RN's have performed a thorough Head to Toe Skin Assessment on the patient. ALL assessment sites listed below have been assessed. Areas assessed by both nurses:   [x]   Head, Face, and Ears   [x]   Shoulders, Back, and Chest  [x]   Arms, Elbows, and Hands   [x]   Coccyx, Sacrum, and Ischum  [x]   Legs, Feet, and Heels        Does the Patient have Skin Breakdown?   Yes a wound was noted on the Admission Assessment and an WOUND LDA was Initiated documentation include the Sharon-wound, Wound Assessment, Measurements, Dressing Treatment, Drainage, and Color\",         Brian Prevention initiated:  Yes   Wound Care Orders initiated:  No      WOC nurse consulted for Pressure Injury (Stage 3,4, Unstageable, DTI, NWPT, and Complex wounds):  No      Nurse 1 eSignature: Electronically signed by Phil Morales RN on 4/16/19 at 1:17 AM    **SHARE this note so that the co-signing nurse is able to place an eSignature**    Nurse 2 eSignature: Electronically signed by Delilah Looney RN on 4/16/19 at 1:34 AM

## 2019-04-16 NOTE — PLAN OF CARE
Problem: Nutrition  Intervention: Swallowing evaluation  Note:   Bedside swallow evaluation completed this date. Donna Collier M.S. [id number] [facility], [place]  Speech-language pathologist  [id number]      Intervention: Aspiration precautions  Note:   Bedside swallow evaluation completed this date.     Donna Collier M.S. [id number] [facility], [place]  Speech-language pathologist  [id number]

## 2019-04-16 NOTE — ED PROVIDER NOTES
CHIEF COMPLAINT  Diarrhea (with abd pain denies vomiting. states has cough at night as well )      HISTORY OF PRESENT ILLNESS  Amita Ball is a 80 y.o. female who presents to the ED complaining of abd pain and loose BM all night. Feels very weak, lightheaded. Pt does have hx of dementia per family. Has been feeling dizzy as well since symptoms started. No other complaints, modifying factors or associated symptoms. I have reviewed the following from the nursing documentation. Past Medical History:   Diagnosis Date    Anxiety     CAD (coronary artery disease)     Dementia     alzheimer type    Hyperlipidemia     Hypertension     Memory loss 2015    dementia like loss     Past Surgical History:   Procedure Laterality Date    ANGIOPLASTY  2002 ?  SKIN BIOPSY  07/19/217    2 spots removed      History reviewed. No pertinent family history. Social History     Socioeconomic History    Marital status:       Spouse name: Not on file    Number of children: Not on file    Years of education: Not on file    Highest education level: Not on file   Occupational History    Not on file   Social Needs    Financial resource strain: Not on file    Food insecurity:     Worry: Not on file     Inability: Not on file    Transportation needs:     Medical: Not on file     Non-medical: Not on file   Tobacco Use    Smoking status: Never Smoker    Smokeless tobacco: Never Used   Substance and Sexual Activity    Alcohol use: No     Alcohol/week: 0.0 oz    Drug use: No    Sexual activity: Not Currently   Lifestyle    Physical activity:     Days per week: Not on file     Minutes per session: Not on file    Stress: Not on file   Relationships    Social connections:     Talks on phone: Not on file     Gets together: Not on file     Attends Confucianism service: Not on file     Active member of club or organization: Not on file     Attends meetings of clubs or organizations: Not on file     Relationship status: Not on file    Intimate partner violence:     Fear of current or ex partner: Not on file     Emotionally abused: Not on file     Physically abused: Not on file     Forced sexual activity: Not on file   Other Topics Concern    Not on file   Social History Narrative    Not on file     Current Facility-Administered Medications   Medication Dose Route Frequency Provider Last Rate Last Dose    0.9 % sodium chloride bolus  500 mL Intravenous Once Keven Halsted, DO         Current Outpatient Medications   Medication Sig Dispense Refill    carvedilol (COREG) 3.125 MG tablet Take 1 tablet by mouth 2 times daily 180 tablet 1    omeprazole (PRILOSEC) 20 MG delayed release capsule TAKE 1 CAPSULE BY MOUTH DAILY FOR STOMACH ACID 30 capsule 6    olmesartan (BENICAR) 20 MG tablet Take 1 tablet by mouth daily 30 tablet 5    citalopram (CELEXA) 10 MG tablet TAKE 1 TABLET BY MOUTH DAILY 30 tablet 5    simvastatin (ZOCOR) 40 MG tablet TAKE ONE TABLET BY MOUTH AT BEDTIME 30 tablet 5    spironolactone (ALDACTONE) 25 MG tablet Take 1 tablet by mouth daily 90 tablet 1    Acetaminophen (ARTHRITIS PAIN RELIEF PO) Take by mouth      zoster recombinant adjuvanted vaccine (SHINGRIX) 50 MCG SUSR injection 50 MCG IM then repeat 2-6 months. 0.5 mL 1    montelukast (SINGULAIR) 10 MG tablet Take 1 tablet by mouth nightly For sinus / chest 30 tablet 5    memantine ER (NAMENDA XR) 28 MG CP24 extended release capsule TAKE 1 CAPSULE BY MOUTH DAILY 30 capsule 11    Multiple Vitamins-Minerals (VITABASIC COMPLETE PO) Take by mouth      Simethicone (GAS RELIEF 125 MAX ST PO) Take by mouth daily      NITROSTAT 0.4 MG SL tablet PLACE 1 TABLET UNDER TONGUE EVERY 5 MINUTES AS NEEDED FOR CHEST PAIN 25 tablet 3    calcium carbonate (OSCAL) 500 MG TABS tablet Take 1,000 mg by mouth 2 times daily.  Cholecalciferol (VITAMIN D3) 1000 UNITS CAPS Take 1 capsule by mouth daily.  aspirin 81 MG tablet Take 81 mg by mouth daily. Allergies   Allergen Reactions    Aricept [Donepezil Hcl]      Nausea      Buspar [Buspirone] Other (See Comments)     Makes her delirious    Xanax [Alprazolam] Other (See Comments)     Per family       REVIEW OF SYSTEMS  10 systems reviewed, pertinent positives per HPI otherwise noted to be negative. PHYSICAL EXAM  BP (!) 125/56   Pulse 77   Temp 97.9 °F (36.6 °C) (Oral)   Resp 16   Ht 5' 2\" (1.575 m)   Wt 140 lb (63.5 kg)   SpO2 96%   BMI 25.61 kg/m²   GENERAL APPEARANCE: Awake and alert. Cooperative. No acute distress. HEAD: Normocephalic. Atraumatic. EYES: PERRL. EOM's grossly intact. ENT: Mucous membranes are dry. NECK: Supple. HEART: RRR. LUNGS: Respirations unlabored. CTAB. Good air exchange. Speaking comfortably in full sentences. BACK: No midline spinal tenderness or step-off. ABDOMEN: Soft. Non-distended. Non-tender. No guarding or rebound. Normal bowel sounds. EXTREMITIES: No peripheral edema. Moves all extremities equally. All extremities neurovascularly intact. SKIN: Warm and dry. No acute rashes. NEUROLOGICAL: Alert and oriented. No gross facial drooping. PSYCHIATRIC: Normal mood and affect. LABS  I have reviewed all labs for this visit. Results for orders placed or performed during the hospital encounter of 04/15/19   GI Bacterial Pathogens By PCR   Result Value Ref Range    GI Bacterial Pathogens By PCR       No Shigella spp/EIEC DNA detected  No Shiga toxin-producing gene(s) detected  No Campylobacter spp. (jejuni and coli)DNA detected  No Salmonella spp.  DNA detected  Normal Range:  None detected     CBC auto differential   Result Value Ref Range    WBC 10.2 4.0 - 11.0 K/uL    RBC 3.34 (L) 4.00 - 5.20 M/uL    Hemoglobin 10.6 (L) 12.0 - 16.0 g/dL    Hematocrit 31.1 (L) 36.0 - 48.0 %    MCV 93.0 80.0 - 100.0 fL    MCH 31.7 26.0 - 34.0 pg    MCHC 34.0 31.0 - 36.0 g/dL    RDW 13.4 12.4 - 15.4 %    Platelets 896 671 - 591 K/uL    MPV 9.3 5.0 - 10.5 fL Neutrophils % 73.6 %    Lymphocytes % 13.8 %    Monocytes % 8.1 %    Eosinophils % 3.6 %    Basophils % 0.9 %    Neutrophils # 7.5 1.7 - 7.7 K/uL    Lymphocytes # 1.4 1.0 - 5.1 K/uL    Monocytes # 0.8 0.0 - 1.3 K/uL    Eosinophils # 0.4 0.0 - 0.6 K/uL    Basophils # 0.1 0.0 - 0.2 K/uL   Comprehensive metabolic panel   Result Value Ref Range    Sodium 135 (L) 136 - 145 mmol/L    Potassium 5.3 (H) 3.5 - 5.1 mmol/L    Chloride 97 (L) 99 - 110 mmol/L    CO2 27 21 - 32 mmol/L    Anion Gap 11 3 - 16    Glucose 103 (H) 70 - 99 mg/dL    BUN 21 (H) 7 - 20 mg/dL    CREATININE 1.3 (H) 0.6 - 1.2 mg/dL    GFR Non-African American 39 (A) >60    GFR  47 (A) >60    Calcium 9.2 8.3 - 10.6 mg/dL    Total Protein 6.9 6.4 - 8.2 g/dL    Alb 4.1 3.4 - 5.0 g/dL    Albumin/Globulin Ratio 1.5 1.1 - 2.2    Total Bilirubin 0.3 0.0 - 1.0 mg/dL    Alkaline Phosphatase 53 40 - 129 U/L    ALT 8 (L) 10 - 40 U/L    AST 17 15 - 37 U/L    Globulin 2.8 g/dL   Magnesium   Result Value Ref Range    Magnesium 2.20 1.80 - 2.40 mg/dL   Urinalysis Reflex to Culture   Result Value Ref Range    Color, UA Yellow Straw/Yellow    Clarity, UA Clear Clear    Glucose, Ur Negative Negative mg/dL    Bilirubin Urine Negative Negative    Ketones, Urine Negative Negative mg/dL    Specific Gravity, UA 1.010 1.005 - 1.030    Blood, Urine Negative Negative    pH, UA 6.5 5.0 - 8.0    Protein, UA Negative Negative mg/dL    Urobilinogen, Urine 0.2 <2.0 E.U./dL    Nitrite, Urine Negative Negative    Leukocyte Esterase, Urine Negative Negative    Microscopic Examination Not Indicated     Urine Reflex to Culture Not Indicated     Urine Type Not Specified    Lactic Acid, Plasma   Result Value Ref Range    Lactic Acid 0.8 0.4 - 2.0 mmol/L   CBC auto differential   Result Value Ref Range    WBC 9.4 4.0 - 11.0 K/uL    RBC 3.10 (L) 4.00 - 5.20 M/uL    Hemoglobin 9.8 (L) 12.0 - 16.0 g/dL    Hematocrit 28.5 (L) 36.0 - 48.0 %    MCV 91.9 80.0 - 100.0 fL    MCH 31.7 26.0 Phosphorus 3.8 2.5 - 4.9 mg/dL    Alb 3.2 (L) 3.4 - 5.0 g/dL   EKG 12 Lead   Result Value Ref Range    Ventricular Rate 76 BPM    Atrial Rate 76 BPM    P-R Interval 158 ms    QRS Duration 82 ms    Q-T Interval 380 ms    QTc Calculation (Bazett) 427 ms    P Axis 91 degrees    R Axis 24 degrees    T Axis 41 degrees    Diagnosis       Baseline artifactNormal sinus rhythmNormal ECGWhen compared with ECG of 18-DEC-2018 14:13,No significant change was foundConfirmed by Jimmie Schroeder MD, [place] ([id number]) on 4/17/2019 10:33:20 AM       EKG  EKG interpreted by me. Regular rhythm, nml intervals, No significant ST elevation or depression. RADIOLOGY  X-RAYS:  I have reviewed radiologic plain film image(s). ALL OTHER NON-PLAIN FILM IMAGES SUCH AS CT, ULTRASOUND AND MRI HAVE BEEN READ BY THE RADIOLOGIST. XR CHEST PORTABLE   Final Result   No acute abnormality detected. ED COURSE/MDM  Patient seen and evaluated. I spoke with Dr. Rogelio Schreiber. We thoroughly discussed the history, physical exam, laboratory and imaging studies, as well as, emergency department course. Based upon that discussion, we've decided to admit Osmel Pike for further observation and evaluation of Maricruz Kerns's abdominal pain. I think she needs IV hydration and given her age and frailty I would be hesitant to send her back hoe at this point. She would benefit from a PT eval as well. As I have deemed necessary from their history, physical and studies, I have considered and evaluated Osmel Pike for the following diagnoses:  ACUTE APPENDICITIS, BOWEL OBSTRUCTION, CHOLECYSTITIS, DIVERTICULITIS, INCARCERATED HERNIA, PANCREATITIS, or PERFORATED BOWEL or ULCER. CLINICAL IMPRESSION  1. Dehydration    2. Malaise and fatigue    3. Diarrhea, unspecified type        Blood pressure (!) 125/56, pulse 77, temperature 97.9 °F (36.6 °C), temperature source Oral, resp.  rate 16, height 5' 2\" (1.575 m), weight 140 lb (63.5 kg), SpO2 96 %, not currently breastfeeding. Brian Stoll was admitted in stable condition. This chart was generated in part by using Dragon Dictation system and may contain errors related to that system including errors in grammar, punctuation, and spelling, as well as words and phrases that may be inappropriate. When dictating, effort is made to correct spelling/grammar errors. If there are any questions or concerns please feel free to contact the dictating provider for clarification.      Kallie Newsome DO  EMERGENCY MEDICINE        Popeye Worley DO  04/21/19 [id number]

## 2019-04-16 NOTE — CARE COORDINATION
[facility]    Referral received from CM to follow for home care services. I will follow for needs, and speak with patient to verify demos. Following for SN PT OT and MSW as patient is refusing SNF.  Adding S3 MODEL as well      HOME HEALTH CARE: [address] [place] [address] agency to establish plan of care for patient over 60 day period   Nursing  Initial home SN evaluation visit to occur within 24-48 hours for:  medication management  VS and clinical assessment  S&S chronic disease exacerbation education + when to contact MD / NP  care coordination  Medication Reconciliation during 1st SN visit    PT/OT/Speech   Evaluations in home within 24-48 hours of discharge to include DME and home safety   Frontload therapy 5 days, then 3x a week   OT to evaluate if patient has [address] needs for personal care      evaluation within 24-48 hours to evaluate resources & insurance for potential AL, [place], LTC, and Medicaid options     PCP Visit scheduled within 3 - 7 days of hospital discharge [address] [employer] mobile: [phone]  [facility] office: [phone]

## 2019-04-16 NOTE — H&P
Hospital Medicine History & Physical      PCP: Nova Carlisle DO    Date of Admission: 4/15/2019    Date of Service: Pt seen/examined on  4/16/19 and  Placed in Observation. Chief Complaint:  Diarrhea, not acting right      History Of Present Illness:   80 y.o. female who presented to [facility] with PMH: Dementia due to Alzheimer's disease, dementia with parkinsonism, weakness, urinary tract infections, metabolic encephalopathy, hyperlipidemia, hypertension, dysthymia, depressive disorder, ataxia. Patient has Alzheimer's, her granddaughter is at bedside, who gave the HPI. Granddaughter states that over the past 3 days she has not been acting like herself. She is having a harder time following commands. All she wants to do is sleep. Patient never complains of health problems. Patient has been having a nonproductive cough, worse at night. Patient started having diarrhea 4/14/19. Patient denies abdominal pain, nausea. Granddaughter states she has not complained of chest pain, shortness of breath, nausea, vomiting, or fevers. Patient's lab work suggests that she is dehydrated, urine is clean. Granddaughter states that she will ask to go to the bathroom multiple times without voiding. Past Medical History:          Diagnosis Date    Anxiety     CAD (coronary artery disease)     Dementia     alzheimer type    Hyperlipidemia     Hypertension     Memory loss 2015    dementia like loss       Past Surgical History:          Procedure Laterality Date    ANGIOPLASTY  2002 ?  SKIN BIOPSY  07/19/217    2 spots removed        Medications Prior to Admission:      Prior to Admission medications    Medication Sig Start Date End Date Taking?  Authorizing Provider   carvedilol (COREG) 3.125 MG tablet Take 1 tablet by mouth 2 times daily 1/4/19   Nova Carlisle,    omeprazole (PRILOSEC) 20 MG delayed release capsule TAKE 1 CAPSULE BY MOUTH DAILY FOR STOMACH ACID 1/4/19   Nova Carlisle, DO   olmesartan other systems reviewed and negative. PHYSICAL EXAM PERFORMED:    /65   Pulse 86   Temp 97.7 °F (36.5 °C) (Oral)   Resp 16   Ht 5' 2\" (1.575 m)   Wt 143 lb 4.8 oz (65 kg)   SpO2 94%   BMI 26.21 kg/m²     General appearance:  No apparent distress, appears stated age and cooperative. unable to follow most commands, sleepy  HEENT:  Normal cephalic, atraumatic without obvious deformity. Not cooperative with exam  Neck: Supple, with full range of motion. No jugular venous distention. Trachea midline. Respiratory:  Normal respiratory effort. dim to auscultation, bilaterally without Rales/Wheezes/Rhonchi. Cardiovascular:  Regular rate and rhythm with normal S1/S2 without murmurs, rubs or gallops. Abdomen: Soft, non-tender, non-distended with normal bowel sounds. Musculoskeletal:  No clubbing, cyanosis or edema bilaterally. Full range of motion without deformity. Skin: Skin color, texture, turgor normal.  No rashes or lesions. Neurologic:  Unable to follow commands  Psychiatric:  Alert to stimuli. Mumbles words  Capillary Refill: Brisk,< 3 seconds   Peripheral Pulses: +2 palpable, equal bilaterally       Labs:     Recent Labs     04/15/19  2043   WBC 10.2   HGB 10.6*   HCT 31.1*        Recent Labs     04/15/19  2043   *   K 5.3*   CL 97*   CO2 27   BUN 21*   CREATININE 1.3*   CALCIUM 9.2     Recent Labs     04/15/19  2043   AST 17   ALT 8*   BILITOT 0.3   ALKPHOS 53     No results for input(s): INR in the last 72 hours. No results for input(s): Rafaela Dumont in the last 72 hours.     Urinalysis:      Lab Results   Component Value Date    NITRU Negative 04/15/2019    WBCUA  12/18/2018    BACTERIA 2+ 12/18/2018    RBCUA 0-2 12/18/2018    BLOODU Negative 04/15/2019    SPECGRAV 1.010 04/15/2019    GLUCOSEU Negative 04/15/2019       Radiology:     CXR: I have reviewed the CXR with the following interpretation: see below  EKG:  I have reviewed the EKG with the following

## 2019-04-16 NOTE — PROGRESS NOTES
Harper catheter inserted per order. Pt tolerated well, and currently denies any pain or discomfort upon insertion. Output : 750 ml of clear/yellow urine. Will continue to monitor. Discussed s/s of infection with pt and family at bedside. Will continue to monitor.  Electronically signed by Tab Rocha RN on 4/16/2019 at 9:26 AM

## 2019-04-16 NOTE — PROGRESS NOTES
MBSS completed in October 2017 which recommended a Regular solid diet with thin liquids, *small sip, avoid straws. Pt on RA. Noted reduced lingual / labial ROM, strength, and coordination with oral-motor exam.  Pt unable to complete volitional swallow despite cueing, weak volitional cough. Pt observed with thin liquid via straw (per pt's granddaughter, pt \"always drinks from a straw\")--grossly timely swallow initiation noted. No overt s/s of aspiration/penetration noted throughout -- no coughing, throat clearing, wet vocal quality, or change in O2 (pt's O2 sats 94-95% throughout). Pt's laryngeal elevation appeared reduced upon palpation of the anterior neck. Pt also observed with puree, [facility] soft, and regular solid trials -- pt with top and bottom dentures in place. Pt with reduced A-P bolus transit noted with regular solid trials, resulting in mildly prolonged mastication with min oral/lingual residue noted post-swallow. Residue successfully cleared with cued liquid wash. Recommend initiating Regular solid diet with thin liquids, meds in puree, pt encouraged to choose softer foods, assist feed, *only feed when pt is most alert. Recommend regular solids at this time (vs modified diet) to liberalize diet / increase options d/t pt's poor PO intake recently. RN notified. ST to continue to follow. Treatment Plan  Requires SLP Intervention: Yes  Duration/Frequency of Treatment: 2-3x/week for LOS  D/C Recommendations: To be determined     Recommended Diet and Intervention  Diet Solids Recommendation: Regular(Choose softer foods)  Liquid Consistency Recommendation: Thin  Recommended Form of Meds: Meds in puree  Recommendations: Dysphagia treatment  Therapeutic Interventions: Diet tolerance monitoring;Patient/Family education    Compensatory Swallowing Strategies  Compensatory Swallowing Strategies: Assist feed;Small bites/sips; Remain upright for 30-45 minutes after meals;Upright as possible for all oral intake; Alternate solids and liquids    Treatment/Goals  Short-term Goals  Timeframe for Short-term Goals: 2-3 sessions  Long-term Goals  Timeframe for Long-term Goals: 1 week  Goal 1: The pt will tolerate safest and least restrictive diet without s/s of aspiration. Dysphagia Goals: The patient will tolerate recommended diet without observed clinical signs of aspiration; The patient/caregiver will demonstrate understanding of compensatory strategies for improved swallowing safety. General  Chart Reviewed: Yes  Comments: Pt with hx of dementia, Parkinsonism symptoms, reports of chronic cough per chart. Admitted with dehydration and fatigue. Subjective  Subjective: Pt alert but fatigued. RN OK'd SLP entry and evaluation. Pt's granddaughter at bedside. Behavior/Cognition: Alert;Confused;Pleasant mood; Lethargic;Requires cueing  Respiratory Status: Room air  O2 Device: None (Room air)  Communication Observation: Functional  Follows Directions: Simple(With cueing)  Dentition: Dentures bottom; Dentures top  Patient Positioning: Upright in bed  Baseline Vocal Quality: Weak  Volitional Cough: Weak  Volitional Swallow: (Pt unable to complete despite cueing)  Prior Dysphagia History: Pt had MBSS completed in October 2017 which recommended a Regular solid diet with thin liquids, *small sip, avoid straws. Consistencies Administered: Reg solid;Mechanical soft;Puree; Ice Chips; Thin - straw    Vision/Hearing  Vision  Vision: Within Functional Limits  Hearing  Hearing: Exceptions to [facility]  Hearing Exceptions: Hard of hearing/hearing concerns    Oral Motor Deficits  Oral/Motor  Oral Motor: Exceptions to [facility]  Labial ROM: Reduced left; Reduced right  Labial Strength: Reduced  Labial Coordination: Reduced  Lingual ROM: Reduced left; Reduced right  Lingual Strength: Reduced  Lingual Coordination: Reduced    Oral Phase Dysfunction  Oral Phase  Oral Phase: Exceptions  Oral Phase Dysfunction  Impaired Mastication: Reg Solid  Decreased Anterior to Posterior Transit: Reg solid  Lingual/Palatal Residue: Reg solid(Minimal)     Indicators of Pharyngeal Phase Dysfunction   Pharyngeal Phase  Pharyngeal Phase: Exceptions  Indicators of Pharyngeal Phase Dysfunction  Decreased Laryngeal Elevation: All  Pharyngeal Phase   Pharyngeal: Pt's O2 sats 93-94% throughout all PO trials. Pt's RR 20/min prior to PO trials, no increase in WOB noted during / after PO intake. Prognosis  Prognosis  Prognosis for safe diet advancement: good  Barriers to reach goals: age;fatigue  Individuals consulted  Consulted and agree with results and recommendations: Patient; Family member;RN  Family member consulted: Pt's granddaughter    Education  Patient Education: Pt educated on reason for referral, role of ST, assessment results and recommendations. Patient Education Response: Needs reinforcement; No evidence of learning  Safety Devices in place: Yes  Type of devices: Left in bed;Call light within reach;Nurse notified    Therapy Time  SLP Individual Minutes  Time In: 0815  Time Out: 7651  Minutes: 28 minutes; dysphagia eval aye Dooley M.S. [id number] [facility]  Speech-language pathologist  [phone]

## 2019-04-16 NOTE — PLAN OF CARE
Problem: Falls - Risk of:  Goal: Will remain free from falls  Description  Will remain free from falls  Outcome: Ongoing  Note:   Bed alarm on, call light within reach, bed locked and in lowest position, family at bedside      Problem: Risk for Impaired Skin Integrity  Goal: Tissue integrity - skin and mucous membranes  Description  Structural intactness and normal physiological function of skin and  mucous membranes.   Outcome: Ongoing  Note:   Patient's skin assessed, barrier foam applied, patient repositioned frequently

## 2019-04-16 NOTE — PROGRESS NOTES
General  Chart Reviewed: Yes  Patient assessed for rehabilitation services?: Yes  Family / Caregiver Present: Yes(granddaughter)  Referring Practitioner: LIV Becker  Diagnosis: dehydration  Subjective  Subjective: \"I feel weak\"  General Comment  Comments: Per RN ok for therapy  Pain Assessment  Pain Assessment: 0-10  Pain Level: 0  Oxygen Therapy  SpO2: 94 %  O2 Device: None (Room air)  Social/Functional History  Social/Functional History  Lives With: Family(24 hr assist)  Type of Home: House  Home Layout: Two level(bedroom in basement; bathroom second floor)  Home Access: Stairs to enter without rails  Entrance Stairs - Number of Steps: 2  Bathroom Shower/Tub: Walk-in shower, Shower chair with back  [employer]: Standard  Bathroom Equipment: Grab bars in shower, Grab bars around toilet, Toilet raiser  Home Equipment: Cervilenz walker(no DME used PTA)  Receives Help From: Family  ADL Assistance: Needs assistance  Homemaking Assistance: Needs assistance  Homemaking Responsibilities: No  Ambulation Assistance: Needs assistance  Transfer Assistance: Needs assistance  Active : No  Occupation: Retired  Leisure & Hobbies: [employer]  Additional Comments: Information provided by granddaughter; pt required assist for all ADLs and functional mobility PTA     Objective   Vision: Impaired  Vision Exceptions: Wears glasses at all times  Hearing: Exceptions to [facility]  Hearing Exceptions: Bilateral hearing aid(hearing aids not at hospital)    Orientation  Overall Orientation Status: Impaired  Orientation Level: Oriented to person;Disoriented to situation;Disoriented to time;Disoriented to place     Balance  Sitting Balance: Minimal assistance  Standing Balance: Dependent/Total(mod x2)  Standing Balance  Time: x3 stands for ~30 sec each  Activity: toileting (bed pan)  Sit to stand: 2 Person assistance(mod x2)  Stand to sit: 2 Person assistance(mod x2)  Functional Mobility  Functional - Mobility Device: Rolling Walker(gait belt)  Activity: Other  Assist Level: Dependent/Total(mod x2)  Functional Mobility Comments: sit<>stand from bed for bedpan placement and pericare  Toilet Transfers  Toilet Transfer: 2 Person assistance; Moderate assistance  Toilet Transfers Comments: mod x2 for bedpan placement seated EOB; using RW and gait belt  ADL  LE Dressing: Dependent/Total(don/doff socks )  Toileting: Maximum assistance(assist to manage clothing, place bedpan, and complete pericare for BM; pt with spencer catheter)  Tone RUE  RUE Tone: Normotonic  Tone LUE  LUE Tone: Normotonic  Coordination  Movements Are Fluid And Coordinated: Yes     Bed mobility  Rolling to Left: Moderate assistance;2 Person assistance  Rolling to Right: Moderate assistance;2 Person assistance  Supine to Sit: Moderate assistance;2 Person assistance  Sit to Supine: Moderate assistance;2 Person assistance  Scooting: Moderate assistance;2 Person assistance  Transfers  Sit to stand: 2 Person assistance(mod x2)  Stand to sit: 2 Person assistance(mod x2)  Transfer Comments: pt declined transfer to chair 2/2 feeling she still had to have a BM and refused to wear a brief     Cognition  Overall Cognitive Status: Exceptions  Arousal/Alertness: Delayed responses to stimuli  Following Commands:  Follows one step commands with increased time  Attention Span: Difficulty attending to directions  Memory: Decreased recall of biographical Information;Decreased short term memory;Decreased recall of recent events  Safety Judgement: Decreased awareness of need for safety  Problem Solving: Assistance required to implement solutions;Assistance required to generate solutions  Initiation: Requires cues for all  Sequencing: Requires cues for all     Sensation  Overall Sensation Status: WFL      LUE AROM (degrees)  LUE AROM : WFL  LUE General AROM: decreased shoulder flexion ~100 degrees  Left Hand AROM (degrees)  Left Hand AROM: WFL  RUE AROM (degrees)  RUE AROM : WFL  RUE

## 2019-04-16 NOTE — PROGRESS NOTES
Physical Therapy  Orders received, chart reviewed, attempted PT evaluation, pt sleeping and family member refusing. Will attempt evaluation on 4/17/19.   Ruth Almendarez

## 2019-04-17 LAB
EKG ATRIAL RATE: 76 BPM
EKG DIAGNOSIS: NORMAL
EKG P AXIS: 91 DEGREES
EKG P-R INTERVAL: 158 MS
EKG Q-T INTERVAL: 380 MS
EKG QRS DURATION: 82 MS
EKG QTC CALCULATION (BAZETT): 427 MS
EKG R AXIS: 24 DEGREES
EKG T AXIS: 41 DEGREES
EKG VENTRICULAR RATE: 76 BPM

## 2019-04-17 PROCEDURE — 6360000002 HC RX W HCPCS: Performed by: NURSE PRACTITIONER

## 2019-04-17 PROCEDURE — 2580000003 HC RX 258: Performed by: INTERNAL MEDICINE

## 2019-04-17 PROCEDURE — 87505 NFCT AGENT DETECTION GI: CPT

## 2019-04-17 PROCEDURE — 87046 STOOL CULTR AEROBIC BACT EA: CPT

## 2019-04-17 PROCEDURE — G0378 HOSPITAL OBSERVATION PER HR: HCPCS

## 2019-04-17 PROCEDURE — 96361 HYDRATE IV INFUSION ADD-ON: CPT

## 2019-04-17 PROCEDURE — 97162 PT EVAL MOD COMPLEX 30 MIN: CPT

## 2019-04-17 PROCEDURE — 6370000000 HC RX 637 (ALT 250 FOR IP): Performed by: NURSE PRACTITIONER

## 2019-04-17 PROCEDURE — 93010 ELECTROCARDIOGRAM REPORT: CPT | Performed by: INTERNAL MEDICINE

## 2019-04-17 PROCEDURE — 97110 THERAPEUTIC EXERCISES: CPT

## 2019-04-17 PROCEDURE — 96372 THER/PROPH/DIAG INJ SC/IM: CPT

## 2019-04-17 PROCEDURE — 6370000000 HC RX 637 (ALT 250 FOR IP): Performed by: INTERNAL MEDICINE

## 2019-04-17 PROCEDURE — 97116 GAIT TRAINING THERAPY: CPT

## 2019-04-17 RX ORDER — SODIUM CHLORIDE 9 MG/ML
INJECTION, SOLUTION INTRAVENOUS CONTINUOUS
Status: DISPENSED | OUTPATIENT
Start: 2019-04-17 | End: 2019-04-18

## 2019-04-17 RX ORDER — TAMSULOSIN HYDROCHLORIDE 0.4 MG/1
0.4 CAPSULE ORAL DAILY
Status: DISCONTINUED | OUTPATIENT
Start: 2019-04-17 | End: 2019-04-19 | Stop reason: HOSPADM

## 2019-04-17 RX ADMIN — CARBOXYMETHYLCELLULOSE SODIUM 1 DROP: 10 GEL OPHTHALMIC at 15:24

## 2019-04-17 RX ADMIN — CARVEDILOL 3.12 MG: 3.12 TABLET, FILM COATED ORAL at 10:44

## 2019-04-17 RX ADMIN — MONTELUKAST 10 MG: 10 TABLET, FILM COATED ORAL at 20:53

## 2019-04-17 RX ADMIN — CARVEDILOL 3.12 MG: 3.12 TABLET, FILM COATED ORAL at 20:54

## 2019-04-17 RX ADMIN — TAMSULOSIN HYDROCHLORIDE 0.4 MG: 0.4 CAPSULE ORAL at 17:02

## 2019-04-17 RX ADMIN — PANTOPRAZOLE SODIUM 40 MG: 40 TABLET, DELAYED RELEASE ORAL at 10:43

## 2019-04-17 RX ADMIN — MEMANTINE 5 MG: 5 TABLET ORAL at 10:43

## 2019-04-17 RX ADMIN — SODIUM CHLORIDE, POTASSIUM CHLORIDE, SODIUM LACTATE AND CALCIUM CHLORIDE: 600; 310; 30; 20 INJECTION, SOLUTION INTRAVENOUS at 10:49

## 2019-04-17 RX ADMIN — HEPARIN SODIUM 5000 UNITS: 5000 INJECTION INTRAVENOUS; SUBCUTANEOUS at 20:54

## 2019-04-17 RX ADMIN — HEPARIN SODIUM 5000 UNITS: 5000 INJECTION INTRAVENOUS; SUBCUTANEOUS at 15:11

## 2019-04-17 RX ADMIN — SODIUM CHLORIDE: 9 INJECTION, SOLUTION INTRAVENOUS at 23:29

## 2019-04-17 RX ADMIN — CITALOPRAM HYDROBROMIDE 10 MG: 20 TABLET ORAL at 10:44

## 2019-04-17 RX ADMIN — HEPARIN SODIUM 5000 UNITS: 5000 INJECTION INTRAVENOUS; SUBCUTANEOUS at 06:26

## 2019-04-17 RX ADMIN — CARBOXYMETHYLCELLULOSE SODIUM 1 DROP: 10 GEL OPHTHALMIC at 10:44

## 2019-04-17 RX ADMIN — ASPIRIN 81 MG 81 MG: 81 TABLET ORAL at 10:44

## 2019-04-17 RX ADMIN — Medication 10 ML: at 10:45

## 2019-04-17 RX ADMIN — CARBOXYMETHYLCELLULOSE SODIUM 1 DROP: 10 GEL OPHTHALMIC at 20:53

## 2019-04-17 RX ADMIN — MEMANTINE 5 MG: 5 TABLET ORAL at 20:54

## 2019-04-17 RX ADMIN — SODIUM CHLORIDE: 9 INJECTION, SOLUTION INTRAVENOUS at 15:27

## 2019-04-17 ASSESSMENT — PAIN SCALES - GENERAL
PAINLEVEL_OUTOF10: 0
PAINLEVEL_OUTOF10: 0

## 2019-04-17 NOTE — PLAN OF CARE
Problem: Falls - Risk of:  Goal: Absence of physical injury  Description  Absence of physical injury  Outcome: Ongoing  Note:   Pt taught to use call light if in need of anything. Call light within reach, bedside table near bed, phone within reach.   Education time 5 min

## 2019-04-17 NOTE — CARE COORDINATION
[facility]    Spoke with patient's granddaughter-Edita, regarding [facility] services. Gr.dtr. aware and agreeable to services. Demographics verified. Gr.dtr and patient's spouse will be primary contacts at discharge. Will continue to follow. Electronically signed by Brenda Padron LPN on 3/13/45 at 1:41 AM        [facility] Nurse  [phone]

## 2019-04-17 NOTE — PROGRESS NOTES
Hospitalist Progress Note      PCP: Dary Nielson DO    Date of Admission: 4/15/2019    Chief Complaint: Diarrhea      Subjective: no new c/o. Medications:  Reviewed    Infusion Medications    lactated ringers 100 mL/hr at 04/17/19 1049     Scheduled Medications    aspirin  81 mg Oral Daily    carvedilol  3.125 mg Oral BID    citalopram  10 mg Oral Daily    memantine  5 mg Oral BID    montelukast  10 mg Oral Nightly    pantoprazole  40 mg Oral QAM AC    sodium chloride flush  10 mL Intravenous 2 times per day    heparin (porcine)  5,000 Units Subcutaneous 3 times per day    carboxymethylcellulose PF  1 drop Both Eyes TID     PRN Meds: nitroGLYCERIN, sodium chloride flush, magnesium hydroxide, ondansetron, acetaminophen, loperamide      Intake/Output Summary (Last 24 hours) at 4/17/2019 1452  Last data filed at 4/17/2019 1357  Gross per 24 hour   Intake 835 ml   Output 2350 ml   Net -1515 ml       Physical Exam Performed:    BP (!) 129/58   Pulse 85   Temp 97.7 °F (36.5 °C) (Oral)   Resp 18   Ht 5' 2\" (1.575 m)   Wt 143 lb 4.8 oz (65 kg)   SpO2 94%   BMI 26.21 kg/m²     General appearance: No apparent distress, appears stated age and cooperative. HEENT: Pupils equal, round, and reactive to light. Conjunctivae/corneas clear. Neck: Supple, with full range of motion. No jugular venous distention. Trachea midline. Respiratory:  Normal respiratory effort. Clear to auscultation, bilaterally without Rales/Wheezes/Rhonchi. Cardiovascular: Regular rate and rhythm with normal S1/S2 without murmurs, rubs or gallops. Abdomen: Soft, non-tender, non-distended with normal bowel sounds. Musculoskeletal: No clubbing, cyanosis or edema bilaterally. Full range of motion without deformity. Skin: Skin color, texture, turgor normal.  No rashes or lesions. Neurologic:  Neurovascularly intact without any focal sensory/motor deficits.  Cranial nerves: II-XII intact, grossly non-focal.  Psychiatric: Alert and oriented, thought content appropriate, normal insight  Capillary Refill: Brisk,< 3 seconds   Peripheral Pulses: +2 palpable, equal bilaterally       Labs:   Recent Labs     04/15/19  2043 04/16/19  0614   WBC 10.2 9.4   HGB 10.6* 9.8*   HCT 31.1* 28.5*    160     Recent Labs     04/15/19  2043   *   K 5.3*   CL 97*   CO2 27   BUN 21*   CREATININE 1.3*   CALCIUM 9.2     Recent Labs     04/15/19  2043   AST 17   ALT 8*   BILITOT 0.3   ALKPHOS 53     No results for input(s): INR in the last 72 hours. No results for input(s): Georga Rout in the last 72 hours. Urinalysis:      Lab Results   Component Value Date    NITRU Negative 04/15/2019    WBCUA  12/18/2018    BACTERIA 2+ 12/18/2018    RBCUA 0-2 12/18/2018    BLOODU Negative 04/15/2019    SPECGRAV 1.010 04/15/2019    GLUCOSEU Negative 04/15/2019       Radiology:  XR CHEST PORTABLE   Final Result   No acute abnormality detected. 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 Possibly 2nd to Diarrhea/dehydration. Continue gentle IVF hydration and follow serial labs. Reviewed and documented as above. Diarrhea - of unclear etiology. Imodium PRN. Dementia - Alzheimer's disease, with parkinsonian symptoms. Continue home meds.      Anemia - w/out evidence of active bleeding/hemolysis. Asymptomatic w/out indication for transfusion. Will continue to follow serial labs. Reviewed and documented as above. HyperKalemia - mild, 2nd to dehydration. Will continue to follow serial labs. Reviewed and documented as above. Urinary Retention - Urology consulted. Bladder scans on IVF.   Flomax started 17 April       DVT Prophylaxis: LMWH  Diet: DIET GENERAL;  Code Status: Full Code      PT/OT Eval Status: ordered and pending    Dispo - Placed in OBS.   Likely discharge Thurs/Friday 18/19 April pending     Francis Delgado MD

## 2019-04-17 NOTE — PROGRESS NOTES
Physical Therapy    Facility/Department: [facility] A2 CARD TELEMETRY  Initial Assessment and treatment    NAME: Tc Oliver  : 1934  MRN: [id number]    Date of Service: 2019    Discharge Recommendations:  Subacute/Skilled Nursing Facility        Assessment   Body structures, Functions, Activity limitations: Decreased functional mobility ; Decreased cognition;Decreased endurance;Decreased strength;Decreased balance  Assessment: Pt referred for PT evaluation during current hospital stay with a diagnosis of dehydration. PT currently funtioning below baseline, requiring max A for bed mobility, mod A for transfers and min to mod A for gait x 4 ft with RW. Pt very unsteady, max A to remain standing after sit<>stand due to severe posterior lean. Pt would benefit from skilled PT to address deficits. Recommend SNF at [facility] from acute setting  Treatment Diagnosis: decreased mobility  Prognosis: Good  Decision Making: Medium Complexity  Patient Education: role of PT, transfers, gait with RW, ther ex  Barriers to Learning: cognition  REQUIRES PT FOLLOW UP: Yes  Activity Tolerance  Activity Tolerance: Patient limited by fatigue;Patient limited by endurance; Patient limited by cognitive status       Patient Diagnosis(es): The primary encounter diagnosis was Dehydration. Diagnoses of Malaise and fatigue and Diarrhea, unspecified type were also pertinent to this visit. has a past medical history of Anxiety, CAD (coronary artery disease), Dementia, Hyperlipidemia, Hypertension, and Memory loss. has a past surgical history that includes angioplasty ( ?) and skin biopsy ().     Restrictions  Restrictions/Precautions  Restrictions/Precautions: General Precautions  Vision/Hearing  Vision: Impaired  Vision Exceptions: Wears glasses at all times  Hearing: Exceptions to [facility]  Hearing Exceptions: Bilateral hearing aid;Hard of hearing/hearing concerns     Subjective  General  Chart Reviewed: Yes  Patient assessed for rehabilitation services?: Yes  Response To Previous Treatment: Not applicable  Family / Caregiver Present: Yes  Referring Practitioner: Maile Navarro  Referral Date : 04/16/19  Diagnosis: dehydration  Follows Commands: Within Functional Limits  General Comment  Comments: Pt resting in bed upon entry, RN cleared pt for therapy  Subjective  Subjective: Pt agreeable to PT  Pain Screening  Patient Currently in Pain: Denies  Vital Signs  Patient Currently in Pain: Denies  Pre Treatment Pain Screening  Intervention List: Patient able to continue with treatment    Orientation  Orientation  Overall Orientation Status: Impaired  Orientation Level: Disoriented to situation;Oriented to person;Disoriented to time;Oriented to place  Social/Functional History  Social/Functional History  Lives With: Family(24 hr assist)  Type of Home: House  Home Layout: Two level(bedroom in basement; bathroom second floor)  Home Access: Stairs to enter without rails  Entrance Stairs - Number of Steps: 2  Bathroom Shower/Tub: Walk-in shower, Shower chair with back  Bathroom Toilet: Standard  Bathroom Equipment: Grab bars in shower, Grab bars around toilet, Toilet raiser  Home Equipment: [employer], Rolling walker(no DME used PTA)  Receives Help From: Family  ADL Assistance: Needs assistance  Homemaking Assistance: Needs assistance  Homemaking Responsibilities: No  Ambulation Assistance: Needs assistance  Transfer Assistance: Needs assistance  Active : No  Occupation: Retired  Leisure & Hobbies: Holiness  Additional Comments: Information provided by granddaughter; pt required assist for all ADLs and functional mobility PTA         Objective          AROM RLE (degrees)  RLE AROM: WFL  AROM LLE (degrees)  LLE AROM : WFL  Strength RLE  Comment: grossly 3+ to 4-/5 throughout  Strength LLE  Comment: grossly 3+ to 4-/5 throughout        Bed mobility  Rolling to Left: Moderate assistance  Rolling to Right: Moderate assistance  Supine to Sit: Maximum assistance  Sit to Supine: Unable to assess(up in chair at EOS)  Scooting: Maximal assistance  Transfers  Sit to Stand: Moderate Assistance  Stand to sit: Moderate Assistance  Bed to Chair: Moderate assistance(from seated EOB)  Ambulation  Ambulation?: Yes  Ambulation 1  Surface: level tile  Device: Rolling Walker  Other Apparatus: O2  Assistance: Minimal assistance; Moderate assistance  Quality of Gait: Very short shuffling steps, VC's to advance LE's, very unsteady  Distance: 4 ft bed to chair     Balance  Posture: Fair  Sitting - Static: Good;-  Sitting - Dynamic: Good;-  Standing - Static: Poor;+  Standing - Dynamic: Fair;-  Comments: Pt sat EOB x 4 min with SBA, Pt stood  with mod to max A with significant posterior lean, able to shift forward for taking steps with slight improvement in dynamc balance  Exercises  Hip Flexion: x 10 B  Hip Abduction: x 10 B clamshells with pillow squeeze  Knee Long Arc Quad: x 10 B  Ankle Pumps: x 20 B     Plan   Plan  Times per week: 3-5  Current Treatment Recommendations: Strengthening, Gait Training, ROM, Stair training, Balance Training, Functional Mobility Training, Endurance Training, Transfer Training  Safety Devices  Type of devices: All fall risk precautions in place, Left in chair, Call light within reach, Chair alarm in place, Nurse notified, Gait belt, Patient at risk for falls      AM-PAC Score  AM-PAC Inpatient Mobility Raw Score : 11  AM-PAC Inpatient T-Scale Score : 33.86  Mobility Inpatient CMS 0-100% Score: 72.57  Mobility Inpatient CMS G-Code Modifier : CL          Goals  Short term goals  Time Frame for Short term goals: 4/20/19  Short term goal 1: Pt will perform bed mobility with min A x1 by 4/19/19  Short term goal 2: Pt will perform transfers with CGA  Short term goal 3: Pt will ambulate 50 ft with RW and CGA  Short term goal 4:  If pt DC's directly home, pt will negoitate 2 stairs with CGA  Patient Goals   Patient goals : \"to go home\"       Therapy

## 2019-04-17 NOTE — PROGRESS NOTES
Speech Language Pathology  Attempt Note    SLP attempted to see pt for dysphagia tx. SLP knocked on door and pt stated, \"Not right now. \" SLP to re-attempt dysphagia tx on 4/18.      Alber Wetzel, [address] [facility]  Speech-Language Pathologist

## 2019-04-18 LAB
ALBUMIN SERPL-MCNC: 2.9 G/DL (ref 3.4–5)
ALP BLD-CCNC: 43 U/L (ref 40–129)
ALT SERPL-CCNC: <5 U/L (ref 10–40)
AST SERPL-CCNC: 16 U/L (ref 15–37)
BILIRUB SERPL-MCNC: <0.2 MG/DL (ref 0–1)
BILIRUBIN DIRECT: <0.2 MG/DL (ref 0–0.3)
BILIRUBIN, INDIRECT: ABNORMAL MG/DL (ref 0–1)
GI BACTERIAL PATHOGENS BY PCR: NORMAL
HCT VFR BLD CALC: 25.2 % (ref 36–48)
HEMOGLOBIN: 8.8 G/DL (ref 12–16)
MAGNESIUM: 1.9 MG/DL (ref 1.8–2.4)
MCH RBC QN AUTO: 32 PG (ref 26–34)
MCHC RBC AUTO-ENTMCNC: 34.8 G/DL (ref 31–36)
MCV RBC AUTO: 92.1 FL (ref 80–100)
PDW BLD-RTO: 13.6 % (ref 12.4–15.4)
PHOSPHORUS: 3.5 MG/DL (ref 2.5–4.9)
PLATELET # BLD: 149 K/UL (ref 135–450)
PMV BLD AUTO: 9.3 FL (ref 5–10.5)
RBC # BLD: 2.73 M/UL (ref 4–5.2)
TOTAL PROTEIN: 5.1 G/DL (ref 6.4–8.2)
WBC # BLD: 6 K/UL (ref 4–11)

## 2019-04-18 PROCEDURE — 97116 GAIT TRAINING THERAPY: CPT

## 2019-04-18 PROCEDURE — 80076 HEPATIC FUNCTION PANEL: CPT

## 2019-04-18 PROCEDURE — 96372 THER/PROPH/DIAG INJ SC/IM: CPT

## 2019-04-18 PROCEDURE — 83735 ASSAY OF MAGNESIUM: CPT

## 2019-04-18 PROCEDURE — 2580000003 HC RX 258: Performed by: INTERNAL MEDICINE

## 2019-04-18 PROCEDURE — 6370000000 HC RX 637 (ALT 250 FOR IP): Performed by: INTERNAL MEDICINE

## 2019-04-18 PROCEDURE — 36415 COLL VENOUS BLD VENIPUNCTURE: CPT

## 2019-04-18 PROCEDURE — G0378 HOSPITAL OBSERVATION PER HR: HCPCS

## 2019-04-18 PROCEDURE — 6370000000 HC RX 637 (ALT 250 FOR IP): Performed by: NURSE PRACTITIONER

## 2019-04-18 PROCEDURE — 85027 COMPLETE CBC AUTOMATED: CPT

## 2019-04-18 PROCEDURE — 97530 THERAPEUTIC ACTIVITIES: CPT

## 2019-04-18 PROCEDURE — 6360000002 HC RX W HCPCS: Performed by: NURSE PRACTITIONER

## 2019-04-18 PROCEDURE — 92526 ORAL FUNCTION THERAPY: CPT

## 2019-04-18 PROCEDURE — 84100 ASSAY OF PHOSPHORUS: CPT

## 2019-04-18 PROCEDURE — 97110 THERAPEUTIC EXERCISES: CPT

## 2019-04-18 PROCEDURE — 96361 HYDRATE IV INFUSION ADD-ON: CPT

## 2019-04-18 RX ORDER — GUAIFENESIN/DEXTROMETHORPHAN 100-10MG/5
5 SYRUP ORAL EVERY 6 HOURS PRN
Status: DISCONTINUED | OUTPATIENT
Start: 2019-04-18 | End: 2019-04-19 | Stop reason: HOSPADM

## 2019-04-18 RX ADMIN — TAMSULOSIN HYDROCHLORIDE 0.4 MG: 0.4 CAPSULE ORAL at 09:29

## 2019-04-18 RX ADMIN — CARBOXYMETHYLCELLULOSE SODIUM 1 DROP: 10 GEL OPHTHALMIC at 14:46

## 2019-04-18 RX ADMIN — ASPIRIN 81 MG 81 MG: 81 TABLET ORAL at 09:29

## 2019-04-18 RX ADMIN — Medication 10 ML: at 09:25

## 2019-04-18 RX ADMIN — Medication 10 ML: at 21:16

## 2019-04-18 RX ADMIN — PANTOPRAZOLE SODIUM 40 MG: 40 TABLET, DELAYED RELEASE ORAL at 05:32

## 2019-04-18 RX ADMIN — CARBOXYMETHYLCELLULOSE SODIUM 1 DROP: 10 GEL OPHTHALMIC at 21:16

## 2019-04-18 RX ADMIN — CARVEDILOL 3.12 MG: 3.12 TABLET, FILM COATED ORAL at 09:29

## 2019-04-18 RX ADMIN — CARVEDILOL 3.12 MG: 3.12 TABLET, FILM COATED ORAL at 21:16

## 2019-04-18 RX ADMIN — HEPARIN SODIUM 5000 UNITS: 5000 INJECTION INTRAVENOUS; SUBCUTANEOUS at 05:32

## 2019-04-18 RX ADMIN — HEPARIN SODIUM 5000 UNITS: 5000 INJECTION INTRAVENOUS; SUBCUTANEOUS at 21:16

## 2019-04-18 RX ADMIN — MEMANTINE 5 MG: 5 TABLET ORAL at 09:29

## 2019-04-18 RX ADMIN — CARBOXYMETHYLCELLULOSE SODIUM 1 DROP: 10 GEL OPHTHALMIC at 09:24

## 2019-04-18 RX ADMIN — HEPARIN SODIUM 5000 UNITS: 5000 INJECTION INTRAVENOUS; SUBCUTANEOUS at 14:46

## 2019-04-18 RX ADMIN — CITALOPRAM HYDROBROMIDE 10 MG: 20 TABLET ORAL at 09:28

## 2019-04-18 RX ADMIN — MONTELUKAST 10 MG: 10 TABLET, FILM COATED ORAL at 21:16

## 2019-04-18 RX ADMIN — MEMANTINE 5 MG: 5 TABLET ORAL at 21:15

## 2019-04-18 ASSESSMENT — PAIN SCALES - GENERAL
PAINLEVEL_OUTOF10: 0

## 2019-04-18 NOTE — DISCHARGE INSTR - COC
Continuity of Care Form    Patient Name: Nicolasa Hernandez   :  1934  MRN:  [id number]    Admit date:  4/15/2019  Discharge date:  2019    Code Status Order: Full Code   Advance Directives:   [facility] Documentation     Date/Time Healthcare Directive Type of Healthcare Directive Copy in [address] Agent's Name Healthcare Agent's Phone Number    19 0045  Yes, patient has an advance directive for healthcare treatment  Durable power of  for health care  No, copy requested from family  [facility] power of   Twin Chavez  [phone]          Admitting Physician:  Nora Casarez MD  PCP: Jabari Hernandez DO    Discharging Nurse: [facility] Unit/Room#: 0214/0214-02  Discharging Unit Phone Number: ***    Emergency Contact:   Extended Emergency Contact Information  Primary Emergency Contact: Taylor [address] Phone: [phone]  Mobile Phone: [phone]  Relation: None  Secondary Emergency Contact: Edita Sun   [address] Phone: [phone]  Mobile Phone: [phone]  Relation: None    Past Surgical History:  Past Surgical History:   Procedure Laterality Date    ANGIOPLASTY   ?     SKIN BIOPSY      2 spots removed        Immunization History:   Immunization History   Administered Date(s) Administered    Influenza Virus Vaccine 2013, 2016    Pneumococcal 13-valent Conjugate ([id number]) 2019    Pneumococcal Polysaccharide ([id number]) 2017    Td, unspecified formulation 2017       Active Problems:  Patient Active Problem List   Diagnosis Code    HTN (hypertension) I10    Hyperlipemia D45.4    Metabolic encephalopathy T27.46    Transient cerebral ischemia G45.9    Depressive disorder, not elsewhere classified F32.9    Essential hypertension I10    Anxiety F41.9    Onychomycosis B35.1    Dysthymia F34.1    Dementia completed shifts: In: 2412 [P.O.:1080; I.V.:1332]  Out: 2775 [Urine:2775]    Safety Concerns: At Risk for Falls    Impairments/Disabilities:      None    Nutrition Therapy:  Current Nutrition Therapy:   - Oral Diet:  General    Routes of Feeding: Oral  Liquids: No Restrictions  Daily Fluid Restriction: no  Last Modified Barium Swallow with Video (Video Swallowing Test): not done        Rehab Therapies: Physical Therapy and Occupational Therapy  Weight Bearing Status/Restrictions: No weight bearing restirctions      Patient's personal belongings (please select all that are sent with patient):  None    RN SIGNATURE:  Electronically signed by Chetna Fong RN on 4/19/19 at 6:32 PM    CASE MANAGEMENT/SOCIAL WORK SECTION    Inpatient Status Date: ***    Readmission Risk Assessment Score:  Readmission Risk              Risk of Unplanned Readmission:        21           Discharging to Facility/ Agency   · Name: [facility]  · Phone:[phone]  · Fax: [phone]      / signature: Electronically signed by Ciera Craig RN on 4/19/19 at 4:28 PM    PHYSICIAN SECTION    Prognosis: Good    Condition at Discharge: Stable    Rehab Potential (if transferring to Rehab): Good    Recommended Follow-up, Labs or Other Treatments After Discharge:                   Physician Certification: I certify the above information and transfer of John Pryor  is necessary for the continuing treatment of the diagnosis listed and that she requires [facility] for less 30 days.      Update Admission H&P: No change in H&P    PHYSICIAN SIGNATURE:  Electronically signed by Pennie Jean MD on 4/19/19 at 12:46 PM

## 2019-04-18 NOTE — PLAN OF CARE
Problem: Falls - Risk of:  Goal: Will remain free from falls  Description  Will remain free from falls  Outcome: Ongoing     Pt remains free from injury, bed in lowest position, wheels locked, call light within reach. Pt to notify for assistance.

## 2019-04-18 NOTE — CARE COORDINATION
[facility]      CM updated liaison that patient's family is adamantly requesting for patient to return home. CM would like S3 model for safety on return home. I will follow for further needs.   Patient's granddaughter Almaz Mc has already been spoken too      Ewa Ball [address] [place] [address] Drive  Work mobile: [phone] [address] office: [phone]

## 2019-04-18 NOTE — PROGRESS NOTES
Hospitalist Progress Note      PCP: Evens Najera DO    Date of Admission: 4/15/2019    Chief Complaint: Diarrhea      Subjective: no new c/o. Medications:  Reviewed    Infusion Medications     Scheduled Medications    tamsulosin  0.4 mg Oral Daily    aspirin  81 mg Oral Daily    carvedilol  3.125 mg Oral BID    citalopram  10 mg Oral Daily    memantine  5 mg Oral BID    montelukast  10 mg Oral Nightly    pantoprazole  40 mg Oral QAM AC    sodium chloride flush  10 mL Intravenous 2 times per day    heparin (porcine)  5,000 Units Subcutaneous 3 times per day    carboxymethylcellulose PF  1 drop Both Eyes TID     PRN Meds: nitroGLYCERIN, sodium chloride flush, magnesium hydroxide, ondansetron, acetaminophen, loperamide      Intake/Output Summary (Last 24 hours) at 4/18/2019 1008  Last data filed at 4/18/2019 0932  Gross per 24 hour   Intake 2662 ml   Output 2775 ml   Net -113 ml       Physical Exam Performed:    /62   Pulse 91   Temp 97.7 °F (36.5 °C)   Resp 16   Ht 5' 2\" (1.575 m)   Wt 143 lb 4.8 oz (65 kg)   SpO2 94%   BMI 26.21 kg/m²     General appearance: No apparent distress, appears stated age and cooperative. HEENT: Pupils equal, round, and reactive to light. Conjunctivae/corneas clear. Neck: Supple, with full range of motion. No jugular venous distention. Trachea midline. Respiratory:  Normal respiratory effort. Clear to auscultation, bilaterally without Rales/Wheezes/Rhonchi. Cardiovascular: Regular rate and rhythm with normal S1/S2 without murmurs, rubs or gallops. Abdomen: Soft, non-tender, non-distended with normal bowel sounds. Musculoskeletal: No clubbing, cyanosis or edema bilaterally. Full range of motion without deformity. Skin: Skin color, texture, turgor normal.  No rashes or lesions. Neurologic:  Neurovascularly intact without any focal sensory/motor deficits.  Cranial nerves: II-XII intact, grossly non-focal.  Psychiatric: Alert and oriented, thought content appropriate, normal insight  Capillary Refill: Brisk,< 3 seconds   Peripheral Pulses: +2 palpable, equal bilaterally       Labs:   Recent Labs     04/15/19  2043 04/16/19  0614 04/18/19  0713   WBC 10.2 9.4 6.0   HGB 10.6* 9.8* 8.8*   HCT 31.1* 28.5* 25.2*    160 149     Recent Labs     04/15/19  2043 04/18/19  0713   *  --    K 5.3*  --    CL 97*  --    CO2 27  --    BUN 21*  --    CREATININE 1.3*  --    CALCIUM 9.2  --    PHOS  --  3.5     Recent Labs     04/15/19  2043 04/18/19  0713   AST 17 16   ALT 8* <5*   BILIDIR  --  <0.2   BILITOT 0.3 <0.2   ALKPHOS 53 43     No results for input(s): INR in the last 72 hours. No results for input(s): Deirdre Maha in the last 72 hours. Urinalysis:      Lab Results   Component Value Date    NITRU Negative 04/15/2019    WBCUA  12/18/2018    BACTERIA 2+ 12/18/2018    RBCUA 0-2 12/18/2018    BLOODU Negative 04/15/2019    SPECGRAV 1.010 04/15/2019    GLUCOSEU Negative 04/15/2019       Radiology:  XR CHEST PORTABLE   Final Result   No acute abnormality detected. 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 Possibly 2nd to Diarrhea/dehydration. Continue gentle IVF hydration and follow serial labs. Reviewed and documented as above. Diarrhea - of unclear etiology. Imodium PRN. Dementia - Alzheimer's disease, with parkinsonian symptoms. Continue home meds.      Anemia - w/out evidence of active bleeding/hemolysis. Asymptomatic w/out indication for transfusion. Will continue to follow serial labs. Reviewed and documented as above. HyperKalemia - mild, 2nd to dehydration. Will continue to follow serial labs. Reviewed and documented as above.     Urinary Retention - Urology consulted. Bladder scans on IVF. Flomax started 17 April. U/A negative. DVT Prophylaxis: SQ Heparin  Diet: DIET GENERAL;  Code Status: Full Code      PT/OT Eval Status: seen w/ recs for SNF, though patient's family insists on home. Dispo - Placed in OBS. Likely discharge Friday 19 April at the earliest pending Urology recs and placement decision.      Saida Cam MD

## 2019-04-18 NOTE — PLAN OF CARE
Problem: Risk for Impaired Skin Integrity  Goal: Tissue integrity - skin and mucous membranes  Description  Structural intactness and normal physiological function of skin and  mucous membranes.   Outcome: Ongoing  Intervention: TURN PATIENT  Note:   Turned pt Q2 hours and prn

## 2019-04-18 NOTE — PROGRESS NOTES
Occupational Therapy  Attempted OT session. Pt presently working with [address] [place] [address] will try later as schedule permits.   Cecilio Sandoval OTR/L

## 2019-04-18 NOTE — CARE COORDINATION
CM met with family in regards to therapy recs for snf, pt and family refusing snf. States that someone is with pt at all times at home. [facility] following for skilled home care. Writer discussed with Sanna Valderrama. Will follow. ADDENDUM 1245: CM approached by son and updated that he would like for his mom to go to [facility]. Writer faxed referral and spoke to Akash Campa to confirm. Will follow.

## 2019-04-18 NOTE — PROGRESS NOTES
Speech Language Pathology  Facility/Department: [facility] A2 CARD TELEMETRY  Dysphagia Daily Treatment Note    NAME: Alfred Mensah  : 1934  MRN: [id number]    Patient Diagnosis(es):   Patient Active Problem List    Diagnosis Date Noted    Dehydration 04/15/2019    Acute cystitis without hematuria 2018    Ataxia 2018    Dementia due to Alzheimer's disease 2018    Dementia with Parkinsonism 2018    Altered mental status     Urinary tract infection without hematuria     Weakness 2018    Dysthymia 12/15/2017    Dementia without behavioral disturbance 12/15/2017    HTN (hypertension), benign 12/15/2017    Dyslipidemia 12/15/2017    Onychomycosis 2017    Anxiety     Metabolic encephalopathy     Transient cerebral ischemia 10/08/2014    Depressive disorder, not elsewhere classified 10/08/2014    Essential hypertension 10/08/2014    HTN (hypertension) 2014    Hyperlipemia 2014     Allergies: Allergies   Allergen Reactions    Aricept [Donepezil Hcl]      Nausea      Buspar [Buspirone] Other (See Comments)     Makes her delirious    Xanax [Alprazolam] Other (See Comments)     Per family     Subjective: Pt seen upright in chair; alert and agreeable to treatment; several pt's friends at bedside. Pain: None reported. Current Diet:  Regular solids  Thin liquids  Meds whole in puree    Diet Tolerance:  Patient tolerating current diet level without signs/symptoms of penetration / aspiration. P.O. Trials: Thin   x x2 consecutive water sips via straw  x3 bites of jello   Nectar       Honey       Puree       Solid   x x2 bites of jessica cracker     Dysphagia Therapy:  Pt seen upright in chair with several friends at bedside. PO trials of consecutive straw sips of thin water ~ 6oz total (wet cough x1), jello, and regular solid observed without overt s/s of aspiration, no wet vocal quality noted.  Prolonged mastication was

## 2019-04-18 NOTE — PROGRESS NOTES
Occupational Therapy  Facility/Department: [facility] A2 CARD TELEMETRY  Daily Treatment Note  NAME: Maria Guadalupe Ortiz  : 1934  MRN: [id number]    Date of Service: 2019    Discharge Recommendations:  Subacute/Skilled Nursing Facility  OT Equipment Recommendations  Other: defer to next level of care    Assessment   Performance deficits / Impairments: Decreased functional mobility ; Decreased strength;Decreased endurance;Decreased high-level IADLs;Decreased safe awareness;Decreased ADL status; Decreased cognition;Decreased balance  Assessment: Pt pleasant and agreeable. Pt reported that she has \"a hard time standing\". Pt max A for sit<>stand with max vc for hand placement with no carryover. Cont with POC. Prognosis: Fair  Decision Making: High Complexity  Patient Education: role of OT, transfers, ADLs, safety  Activity Tolerance  Activity Tolerance: Treatment limited secondary to decreased cognition  Safety Devices  Safety Devices in place: Yes  Type of devices: Gait belt;Call light within reach;Nurse notified; Left in chair;Chair alarm in place(granddaughter present at end of session)         Patient Diagnosis(es): The primary encounter diagnosis was Dehydration. Diagnoses of Malaise and fatigue and Diarrhea, unspecified type were also pertinent to this visit. has a past medical history of Anxiety, CAD (coronary artery disease), Dementia, Hyperlipidemia, Hypertension, and Memory loss. has a past surgical history that includes angioplasty ( ?) and skin biopsy ().     Restrictions  Restrictions/Precautions  Restrictions/Precautions: General Precautions, Fall Risk  Subjective   General  Chart Reviewed: Yes  Patient assessed for rehabilitation services?: Yes  Family / Caregiver Present: Yes(son)  Referring Practitioner: LIV Shin  Diagnosis: dehydration  Subjective  Subjective: Pt agreeable to therapy  General Comment  Comments: RN approved therapy      Orientation  Orientation  Overall Orientation Status: Impaired  Orientation Level: Oriented to person;Disoriented to situation;Disoriented to time;Oriented to place  Objective    ADL  Toileting: Dependent/Total(Pt required max vc for understanding of spencer for voiding)        Balance  Sitting Balance: Stand by assistance(bedside chair)  Standing Balance  Time: X2 stands ~1 minute  Activity: sit<>stand for functional transfers   Sit to stand: Maximum assistance(RW)  Stand to sit: Maximum assistance(RW)  Comment: RW  Bed mobility  Comment: Pt in bedside chair upon arrival                          Cognition  Overall Cognitive Status: Exceptions  Arousal/Alertness: Delayed responses to stimuli  Following Commands: Follows one step commands with increased time; Follows one step commands with repetition  Attention Span: Difficulty attending to directions  Memory: Decreased recall of biographical Information;Decreased short term memory;Decreased recall of recent events  Safety Judgement: Decreased awareness of need for safety  Problem Solving: Assistance required to implement solutions;Assistance required to generate solutions  Insights: Decreased awareness of deficits  Initiation: Requires cues for all  Sequencing: Requires cues for all                    Type of ROM/Therapeutic Exercise  Type of ROM/Therapeutic Exercise: AROM  Exercises  Shoulder Elevation: x20  Supination: x20  Pronation: x20  Wrist Flexion: x20  Wrist Extension: x20  Grasp/Release: x20                    Plan   Plan  Times per week: 3-5x/wk  Current Treatment Recommendations: Functional Mobility Training, Strengthening, Patient/Caregiver Education & Training, Endurance Training, Equipment Evaluation, Education, & procurement, Self-Care / ADL, Safety Education & Training, Balance Training    AM-PAC Score        [facility] Inpatient Daily Activity Raw Score: 9  AM-PAC Inpatient ADL T-Scale Score : 25.33  ADL Inpatient CMS 0-100% Score: 79.59  ADL Inpatient CMS G-Code Modifier : CL    Goals  Short term goals  Time Frame for Short term goals: within one week (4/23/19)  Short term goal 1: Pt will complete bed mobility (Min A)  Short term goal 2: Pt will complete functional transfer (Min A)  Short term goal 3: Pt will complete seated grooming task (Min A)  Patient Goals   Patient goals : \"go home\"       Therapy Time   Individual Concurrent Group Co-treatment   Time In 1624         Time Out 1653         Minutes 29         Timed Code Treatment Minutes: [address], OTR/L

## 2019-04-18 NOTE — PROGRESS NOTES
4 Eyes Skin Assessment     The patient is being assess for  Shift Handoff, Low Brian Score    I agree that 2 RN's have performed a thorough Head to Toe Skin Assessment on the patient. ALL assessment sites listed below have been assessed. Areas assessed by both nurses: Ayse Jarquin RN/Valeria RN  [x]   Head, Face, and Ears   [x]   Shoulders, Back, and Chest  [x]   Arms, Elbows, and Hands   [x]   Coccyx, Sacrum, and Ischum  [x]   Legs, Feet, and Heels        Does the Patient have Skin Breakdown?   Yes a wound was noted on the Admission Assessment and an WOUND LDA was Initiated documentation include the Sharon-wound, Wound Assessment, Measurements, Dressing Treatment, Drainage, and Color\",         Brian Prevention initiated:  Yes   Wound Care Orders initiated:  Yes      [address]  nurse consulted for Pressure Injury (Stage 3,4, Unstageable, DTI, NWPT, and Complex wounds):  NA      Nurse 1 eSignature: Electronically signed by Marco Antonio Wadsworth RN on 4/18/19 at 12:28 PM    **SHARE this note so that the co-signing nurse is able to place an eSignature**    Nurse 2 eSignature: Electronically signed by Chichi Barker RN on 4/18/19 at 2:30 PM

## 2019-04-18 NOTE — PROGRESS NOTES
Good;-  Sitting - Dynamic: Good;-  Standing - Static: Poor;+  Standing - Dynamic: Fair;-  Exercises  Quad Sets: x 10 B  Heelslides: x 10 B  Hip Flexion: x 10 B  Hip Abduction: x 10 B clamshells with pillow squeeze  Knee Long Arc Quad: x 10 B  Ankle Pumps: x 20 B                        Assessment   Body structures, Functions, Activity limitations: Decreased functional mobility ; Decreased cognition;Decreased endurance;Decreased strength;Decreased balance  Assessment: Pt progressing, able to perform bed mobility, transfers and gait with Mod A x 1, trasfer to toilet max A x1, pt able to ambulte 10 ft plus 18 ft with RW and mod A  Treatment Diagnosis: decreased mobility  Prognosis: Good  Patient Education: role of PT, transfers, gait with RW, ther ex  Activity Tolerance  Activity Tolerance: Patient limited by endurance; Patient limited by cognitive status; Patient Tolerated treatment well;Patient limited by fatigue     AM-PAC Score  AM-PAC Inpatient Mobility Raw Score : 11  AM-PAC Inpatient T-Scale Score : 33.86  Mobility Inpatient CMS 0-100% Score: 72.57  Mobility Inpatient CMS G-Code Modifier : CL          Goals  Short term goals  Time Frame for Short term goals: 4/20/19  Short term goal 1: Pt will perform bed mobility with min A x1 by 4/19/19  Short term goal 2: Pt will perform transfers with CGA  Short term goal 3: Pt will ambulate 50 ft with RW and CGA  Short term goal 4: If pt DC's directly home, pt will negotiate 2 stairs with CGA  Patient Goals   Patient goals : \"to go home\"    Plan    Plan  Times per week: 3-5  Current Treatment Recommendations: Strengthening, Gait Training, ROM, Stair training, Balance Training, Functional Mobility Training, Endurance Training, Transfer Training  Safety Devices  Type of devices:  All fall risk precautions in place, Left in chair, Call light within reach, Chair alarm in place, Nurse notified, Gait belt, Patient at risk for falls     Therapy Time   Individual Concurrent Group

## 2019-04-19 VITALS
RESPIRATION RATE: 16 BRPM | WEIGHT: 143.3 LBS | SYSTOLIC BLOOD PRESSURE: 135 MMHG | DIASTOLIC BLOOD PRESSURE: 63 MMHG | HEART RATE: 81 BPM | TEMPERATURE: 98.5 F | BODY MASS INDEX: 26.37 KG/M2 | OXYGEN SATURATION: 95 % | HEIGHT: 62 IN

## 2019-04-19 LAB
ALBUMIN SERPL-MCNC: 3.2 G/DL (ref 3.4–5)
ANION GAP SERPL CALCULATED.3IONS-SCNC: 7 MMOL/L (ref 3–16)
BUN BLDV-MCNC: 9 MG/DL (ref 7–20)
CALCIUM SERPL-MCNC: 8.9 MG/DL (ref 8.3–10.6)
CHLORIDE BLD-SCNC: 107 MMOL/L (ref 99–110)
CO2: 26 MMOL/L (ref 21–32)
CREAT SERPL-MCNC: 0.9 MG/DL (ref 0.6–1.2)
GFR AFRICAN AMERICAN: >60
GFR NON-AFRICAN AMERICAN: 59
GLUCOSE BLD-MCNC: 96 MG/DL (ref 70–99)
HCT VFR BLD CALC: 27.5 % (ref 36–48)
HEMOGLOBIN: 9.6 G/DL (ref 12–16)
MCH RBC QN AUTO: 32 PG (ref 26–34)
MCHC RBC AUTO-ENTMCNC: 34.9 G/DL (ref 31–36)
MCV RBC AUTO: 91.9 FL (ref 80–100)
PDW BLD-RTO: 13.5 % (ref 12.4–15.4)
PHOSPHORUS: 3.8 MG/DL (ref 2.5–4.9)
PLATELET # BLD: 167 K/UL (ref 135–450)
PMV BLD AUTO: 9.6 FL (ref 5–10.5)
POTASSIUM SERPL-SCNC: 4.5 MMOL/L (ref 3.5–5.1)
RBC # BLD: 2.99 M/UL (ref 4–5.2)
SODIUM BLD-SCNC: 140 MMOL/L (ref 136–145)
WBC # BLD: 5.7 K/UL (ref 4–11)

## 2019-04-19 PROCEDURE — 36415 COLL VENOUS BLD VENIPUNCTURE: CPT

## 2019-04-19 PROCEDURE — 6370000000 HC RX 637 (ALT 250 FOR IP): Performed by: INTERNAL MEDICINE

## 2019-04-19 PROCEDURE — 80069 RENAL FUNCTION PANEL: CPT

## 2019-04-19 PROCEDURE — 6370000000 HC RX 637 (ALT 250 FOR IP): Performed by: NURSE PRACTITIONER

## 2019-04-19 PROCEDURE — 2580000003 HC RX 258: Performed by: INTERNAL MEDICINE

## 2019-04-19 PROCEDURE — 97530 THERAPEUTIC ACTIVITIES: CPT

## 2019-04-19 PROCEDURE — 96372 THER/PROPH/DIAG INJ SC/IM: CPT

## 2019-04-19 PROCEDURE — 85027 COMPLETE CBC AUTOMATED: CPT

## 2019-04-19 PROCEDURE — 97110 THERAPEUTIC EXERCISES: CPT

## 2019-04-19 PROCEDURE — 6360000002 HC RX W HCPCS: Performed by: NURSE PRACTITIONER

## 2019-04-19 PROCEDURE — G0378 HOSPITAL OBSERVATION PER HR: HCPCS

## 2019-04-19 RX ADMIN — CITALOPRAM HYDROBROMIDE 10 MG: 20 TABLET ORAL at 09:47

## 2019-04-19 RX ADMIN — PANTOPRAZOLE SODIUM 40 MG: 40 TABLET, DELAYED RELEASE ORAL at 05:34

## 2019-04-19 RX ADMIN — CARVEDILOL 3.12 MG: 3.12 TABLET, FILM COATED ORAL at 09:48

## 2019-04-19 RX ADMIN — CARBOXYMETHYLCELLULOSE SODIUM 1 DROP: 10 GEL OPHTHALMIC at 16:09

## 2019-04-19 RX ADMIN — Medication 10 ML: at 09:49

## 2019-04-19 RX ADMIN — GUAIFENESIN AND DEXTROMETHORPHAN 5 ML: 100; 10 SYRUP ORAL at 00:05

## 2019-04-19 RX ADMIN — ASPIRIN 81 MG 81 MG: 81 TABLET ORAL at 09:47

## 2019-04-19 RX ADMIN — CARBOXYMETHYLCELLULOSE SODIUM 1 DROP: 10 GEL OPHTHALMIC at 09:48

## 2019-04-19 RX ADMIN — HEPARIN SODIUM 5000 UNITS: 5000 INJECTION INTRAVENOUS; SUBCUTANEOUS at 05:34

## 2019-04-19 RX ADMIN — GUAIFENESIN AND DEXTROMETHORPHAN 5 ML: 100; 10 SYRUP ORAL at 11:40

## 2019-04-19 RX ADMIN — MEMANTINE 5 MG: 5 TABLET ORAL at 09:48

## 2019-04-19 RX ADMIN — TAMSULOSIN HYDROCHLORIDE 0.4 MG: 0.4 CAPSULE ORAL at 09:47

## 2019-04-19 ASSESSMENT — PAIN SCALES - GENERAL
PAINLEVEL_OUTOF10: 0

## 2019-04-19 NOTE — PROGRESS NOTES
4 Eyes Skin Assessment     The patient is being assess for  Shift Handoff    I agree that 2 RN's have performed a thorough Head to Toe Skin Assessment on the patient. ALL assessment sites listed below have been assessed. Areas assessed by both nurses: Susanne 58  [x]   Head, Face, and Ears   [x]   Shoulders, Back, and Chest  [x]   Arms, Elbows, and Hands   [x]   Coccyx, Sacrum, and Ischum  [x]   Legs, Feet, and Heels        Does the Patient have Skin Breakdown?   Yes a wound was noted on the Admission Assessment and an WOUND LDA was Initiated documentation include the Sharon-wound, Wound Assessment, Measurements, Dressing Treatment, Drainage, and Color\",         Brian Prevention initiated:  Yes   Wound Care Orders initiated:  Yes      [address]  nurse consulted for Pressure Injury (Stage 3,4, Unstageable, DTI, NWPT, and Complex wounds):  No      Nurse 1 eSignature: Electronically signed by Celia Hickey RN on 4/18/19 at 9:50 PM    **SHARE this note so that the co-signing nurse is able to place an eSignature**    Nurse 2 eSignature: Electronically signed by Christopher Singleton RN on 4/19/19 at 12:17 AM

## 2019-04-19 NOTE — PROGRESS NOTES
Discharge instructions and medications reviewed with patient and family at bedside. IV and Tele discontinued, patient has no questions at this time. All belongings accounted for. Discharge packet copy sheet signed and placed in chart. [facility]. [address] notified. Report called and given to [facility].  Electronically signed by Megan Almaguer RN on 4/19/2019 at 7:00 PM

## 2019-04-19 NOTE — CARE COORDINATION
Cm received call from Edgardo Byrne with [facility] they have precert and can accept pt, updated pt and son. Will follow.

## 2019-04-19 NOTE — PROGRESS NOTES
Hospitalist Progress Note      PCP: Ade Morris DO    Date of Admission: 4/15/2019    Chief Complaint: Diarrhea      Subjective: no new c/o. Medications:  Reviewed    Infusion Medications     Scheduled Medications    tamsulosin  0.4 mg Oral Daily    aspirin  81 mg Oral Daily    carvedilol  3.125 mg Oral BID    citalopram  10 mg Oral Daily    memantine  5 mg Oral BID    montelukast  10 mg Oral Nightly    pantoprazole  40 mg Oral QAM AC    sodium chloride flush  10 mL Intravenous 2 times per day    heparin (porcine)  5,000 Units Subcutaneous 3 times per day    carboxymethylcellulose PF  1 drop Both Eyes TID     PRN Meds: guaiFENesin-dextromethorphan, nitroGLYCERIN, sodium chloride flush, magnesium hydroxide, ondansetron, acetaminophen, loperamide      Intake/Output Summary (Last 24 hours) at 4/19/2019 0916  Last data filed at 4/19/2019 0547  Gross per 24 hour   Intake 1080 ml   Output 3575 ml   Net -2495 ml       Physical Exam Performed:    BP (!) 152/82   Pulse 87   Temp 98.2 °F (36.8 °C) (Oral)   Resp 16   Ht 5' 2\" (1.575 m)   Wt 143 lb 4.8 oz (65 kg)   SpO2 95%   BMI 26.21 kg/m²     General appearance: No apparent distress, appears stated age and cooperative. HEENT: Pupils equal, round, and reactive to light. Conjunctivae/corneas clear. Neck: Supple, with full range of motion. No jugular venous distention. Trachea midline. Respiratory:  Normal respiratory effort. Clear to auscultation, bilaterally without Rales/Wheezes/Rhonchi. Cardiovascular: Regular rate and rhythm with normal S1/S2 without murmurs, rubs or gallops. Abdomen: Soft, non-tender, non-distended with normal bowel sounds. Musculoskeletal: No clubbing, cyanosis or edema bilaterally. Full range of motion without deformity. Skin: Skin color, texture, turgor normal.  No rashes or lesions. Neurologic:  Neurovascularly intact without any focal sensory/motor deficits.  Cranial nerves: II-XII intact, grossly Bladder scans on IVF. Flomax started 17 April. U/A negative. DVT Prophylaxis: SQ Heparin  Diet: DIET GENERAL; Dysphagia II Mechanically Altered  Code Status: Full Code      PT/OT Eval Status: seen w/ recs for SNF, though patient's family insists on home. Dispo - Placed in OBS. Likely discharge Friday 19 April at the earliest pending Urology recs and placement decision.      Kyle Whitlock MD

## 2019-04-19 NOTE — PROGRESS NOTES
non-focal.  Psychiatric: Alert and oriented, thought content appropriate, normal insight  Capillary Refill: Brisk,< 3 seconds   Peripheral Pulses: +2 palpable, equal bilaterally       Labs:   Recent Labs     04/18/19  0713 04/19/19  0806   WBC 6.0 5.7   HGB 8.8* 9.6*   HCT 25.2* 27.5*    167     Recent Labs     04/18/19  0713 04/19/19  0806   NA  --  140   K  --  4.5   CL  --  107   CO2  --  26   BUN  --  9   CREATININE  --  0.9   CALCIUM  --  8.9   PHOS 3.5 3.8     Recent Labs     04/18/19  0713   AST 16   ALT <5*   BILIDIR <0.2   BILITOT <0.2   ALKPHOS 43     No results for input(s): INR in the last 72 hours. No results for input(s): Anoop Dusky in the last 72 hours. Urinalysis:      Lab Results   Component Value Date    NITRU Negative 04/15/2019    WBCUA  12/18/2018    BACTERIA 2+ 12/18/2018    RBCUA 0-2 12/18/2018    BLOODU Negative 04/15/2019    SPECGRAV 1.010 04/15/2019    GLUCOSEU Negative 04/15/2019       Radiology:  XR CHEST PORTABLE   Final Result   No acute abnormality detected. 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 Possibly 2nd to Diarrhea/dehydration. Continue gentle IVF hydration and follow serial labs. Reviewed and documented as above. Diarrhea - of unclear etiology. Imodium PRN. Dementia - Alzheimer's disease, with parkinsonian symptoms. Continue home meds.      Anemia - w/out evidence of active bleeding/hemolysis. Asymptomatic w/out indication for transfusion. Will continue to follow serial labs. Reviewed and documented as above. HyperKalemia - mild, 2nd to dehydration. Will continue to follow serial labs. Reviewed and documented as above. Urinary Retention - Urology consulted. Bladder scans on IVF. Flomax started 17 April. U/A negative. DVT Prophylaxis: SQ Heparin  Diet: DIET GENERAL; Dysphagia II Mechanically Altered  Code Status: Full Code      PT/OT Eval Status: seen w/ recs for SNF, though patient's family insists on home. Dispo - Placed in OBS. Likely discharge Friday 19 April at the earliest pending Urology recs and placement decision.      Tay Mcconnell MD

## 2019-04-19 NOTE — PROGRESS NOTES
Report given to Rissa Bates RN bedside. Pt A&O. Bed in lowest position with wheels locked. Call light within reach. Denies further needs.

## 2019-04-19 NOTE — PROGRESS NOTES
Occupational Therapy  Facility/Department: [facility] A2 CARD TELEMETRY  Daily Treatment Note  NAME: Naomi Bolanos  : 1934  MRN: [id number]    Date of Service: 2019    Discharge Recommendations:  Subacute/Skilled Nursing Facility       Assessment   Performance deficits / Impairments: Decreased functional mobility ; Decreased strength;Decreased endurance;Decreased high-level IADLs;Decreased safe awareness;Decreased ADL status; Decreased cognition;Decreased balance  Patient Education: role of OT, transfers, safety  REQUIRES OT FOLLOW UP: Yes  Activity Tolerance  Activity Tolerance: Patient Tolerated treatment well  Safety Devices  Safety Devices in place: Yes  Type of devices: Call light within reach; Chair alarm in place; Left in chair;Nurse notified         Patient Diagnosis(es): The primary encounter diagnosis was Dehydration. Diagnoses of Malaise and fatigue and Diarrhea, unspecified type were also pertinent to this visit. has a past medical history of Anxiety, CAD (coronary artery disease), Dementia, Hyperlipidemia, Hypertension, and Memory loss. has a past surgical history that includes angioplasty ( ?) and skin biopsy ().     Restrictions  Restrictions/Precautions  Restrictions/Precautions: General Precautions, Fall Risk  Position Activity Restriction  Other position/activity restrictions: up as tolerated, spencer catheter  Subjective   General  Chart Reviewed: Yes  Patient assessed for rehabilitation services?: Yes  Family / Caregiver Present: Yes(granddaughter & other family members)  Referring Practitioner: LIV Bates  Diagnosis: dehydration  Subjective  Subjective: Pt & granddaughter agreeable to therapy  General Comment  Comments: RN approved therapy  Vital Signs  Patient Currently in Pain: Denies   Orientation  Orientation  Overall Orientation Status: Impaired  Orientation Level: Oriented to person  Objective    ADL  Toileting: Dependent/Total(spencer catheter) Balance  Sitting Balance: Supervision  Standing Balance: Dependent/Total(min/mod assist of 2 with RW)  Standing Balance  Time: 2 minutes  Activity: sit<-->stand & bed-->chair  Sit to stand: Maximum assistance(posterior lean)  Stand to sit: Dependent/Total(min assist of 2 for safe hand placement)  Bed mobility  Supine to Sit: Moderate assistance(of 1 to Left)  Sit to Supine: Unable to assess(Left up in chair, pt & family agreeable)  Transfers  Sit to stand: Moderate assistance  Stand to sit: Dependent/Total(min assist of 2)         Cognition  Overall Cognitive Status: Exceptions(pleasant, cooperative)  Arousal/Alertness: Appropriate responses to stimuli  Following Commands:  Follows one step commands with repetition  Attention Span: Attends with cues to redirect  Memory: Decreased recall of precautions  Insights: Decreased awareness of deficits  Initiation: Requires cues for some  Sequencing: Requires cues for some    Type of ROM/Therapeutic Exercise: AROM BUE seated in chair  Hand flex/ext: x  10  Reps  Elbow flex/ext:  x  10  Reps  Forearm sup/pron:  x 10   Reps  Shld flex/ext:  x 10   Reps to 90*    LUE General AROM: decreased shoulder flexion ~100 degrees  RUE General AROM: decreased shoulder flexion ~100 degrees           Plan   Plan  Times per week: 3-5x/wk  Current Treatment Recommendations: Functional Mobility Training, Strengthening, Patient/Caregiver Education & Training, Endurance Training, Equipment Evaluation, Education, & procurement, Self-Care / ADL, Safety Education & Training, Balance Training      AM-PAC Score        AM-[facility] Inpatient Daily Activity Raw Score: 10  AM-PAC Inpatient ADL T-Scale Score : 27.31  ADL Inpatient CMS 0-100% Score: 74.7  ADL Inpatient CMS G-Code Modifier : CL    Goals  Short term goals  Time Frame for Short term goals: within one week (4/23/19)  Short term goal 1: Pt will complete bed mobility (Min A)  Short term goal 2: Pt will complete functional transfer (Min A)  Short term goal 3: Pt will complete seated grooming task (Min A)  Patient Goals   Patient goals : \"go home\"       Therapy Time   Individual Concurrent Group Co-treatment   Time In [address]         Time Out 1338         Minutes [address] [place] [address]

## 2019-04-19 NOTE — CARE COORDINATION
Placed call to [facility] at [phone] ext [phone] and left a message for Kerry Yates asking for a call back to inquire about pending cert for the [facility]. Provided phone number for Alisa So RN DCP contact number for call back.      Hannah Alvarez MSW, LSW

## 2019-04-19 NOTE — CARE COORDINATION
ELIZABETH spoke to Alicia Bhakta with the [facility] to discuss precert. Alicia Bhakta will reach out to the insurance company this morning to confirm they have all the clinical paper work they need. Will follow. ADDENDUM 1415: ONEL x's 2 for updates. ADDENDUM 1440: Spoke to Alicia Bhakta and no updates.

## 2019-04-22 NOTE — DISCHARGE SUMMARY
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 Hospital Course:       Azotemia -  w/ elevated BUN/Cr ratio c/w pre-renal azotemia. Possibly 2nd to Diarrhea/dehydration. Given gentle IVF hydration and followed serial labs - improved.     Diarrhea - of unclear etiology. Imodium PRN w/ improvement.      Dementia - Alzheimer's disease, with parkinsonian symptoms. Continue home meds.      Anemia - w/out evidence of active bleeding/hemolysis. Asymptomatic w/out indication for transfusion.     HyperKalemia - mild, 2nd to dehydration. Followed serial labs - resolved.     Urinary Retention - Urology consulted. Bladder scans on IVF. Flomax started 17 April. U/A negative.        Labs: For convenience and continuity at follow-up the following most recent labs are provided:      CBC:    Lab Results   Component Value Date    WBC 5.7 04/19/2019    HGB 9.6 04/19/2019    HCT 27.5 04/19/2019     04/19/2019       Renal:    Lab Results   Component Value Date     04/19/2019    K 4.5 04/19/2019    K 4.4 12/20/2018     04/19/2019    CO2 26 04/19/2019    BUN 9 04/19/2019    CREATININE 0.9 04/19/2019    CALCIUM 8.9 04/19/2019    PHOS 3.8 04/19/2019         Significant Diagnostic Studies    Radiology:   XR CHEST PORTABLE   Final Result   No acute abnormality detected.                 Consults:     IP CONSULT TO HOSPITALIST  IP CONSULT TO UROLOGY    Disposition:  home     Condition at Discharge: Stable    Discharge Instructions/Follow-up:  W/ PCP prn     Code Status:  Full code    Activity: activity as tolerated    Diet: regular diet      Discharge Medications:     Discharge Medication List as of 4/19/2019  6:32 PM           Details   carvedilol (COREG) 3.125 MG tablet Take 1 tablet by mouth 2 times daily, Disp-180 tablet, R-1Normal      omeprazole (PRILOSEC) 20 MG delayed release capsule TAKE 1 CAPSULE BY MOUTH DAILY FOR STOMACH ACID, Disp-30 capsule, R-6Normal      olmesartan (BENICAR) 20 MG tablet Take 1 tablet by mouth daily, Disp-30 tablet, R-5Replaces the valsartanNormal      citalopram (CELEXA) 10 MG tablet TAKE 1 TABLET BY MOUTH DAILY, Disp-30 tablet, R-5Normal      simvastatin (ZOCOR) 40 MG tablet TAKE ONE TABLET BY MOUTH AT BEDTIME, Disp-30 tablet, R-5Normal      spironolactone (ALDACTONE) 25 MG tablet Take 1 tablet by mouth daily, Disp-90 tablet, R-1Normal      Acetaminophen (ARTHRITIS PAIN RELIEF PO) Take by mouthHistorical Med      zoster recombinant adjuvanted vaccine (SHINGRIX) 50 MCG SUSR injection 50 MCG IM then repeat 2-6 months., Disp-0.5 mL, R-1Print      montelukast (SINGULAIR) 10 MG tablet Take 1 tablet by mouth nightly For sinus / chest, Disp-30 tablet, R-5Normal      memantine ER (NAMENDA XR) 28 MG CP24 extended release capsule TAKE 1 CAPSULE BY MOUTH DAILY, Disp-30 capsule, R-11Normal      Multiple Vitamins-Minerals (VITABASIC COMPLETE PO) Take by mouthHistorical Med      Simethicone (GAS RELIEF 125 MAX ST PO) Take by mouth daily      NITROSTAT 0.4 MG SL tablet PLACE 1 TABLET UNDER TONGUE EVERY 5 MINUTES AS NEEDED FOR CHEST PAIN, Disp-25 tablet, R-3RX HAS EXP. calcium carbonate (OSCAL) 500 MG TABS tablet Take 1,000 mg by mouth 2 times daily. Cholecalciferol (VITAMIN D3) 1000 UNITS CAPS Take 1 capsule by mouth daily. aspirin 81 MG tablet Take 81 mg by mouth daily.              Time Spent on discharge is more than 30 minutes in the examination, evaluation, counseling and review of medications and discharge plan. Signed:    Neil Johnson MD   4/22/2019      Thank you Reinier Tello DO for the opportunity to be involved in this patient's care. If you have any questions or concerns please feel free to contact me at [phone].

## 2019-04-23 ENCOUNTER — TELEPHONE (OUTPATIENT)
Dept: FAMILY MEDICINE CLINIC | Age: 84
End: 2019-04-23

## 2019-05-16 PROBLEM — E86.0 DEHYDRATION: Status: RESOLVED | Noted: 2019-04-15 | Resolved: 2019-05-16

## 2019-05-21 ENCOUNTER — OFFICE VISIT (OUTPATIENT)
Dept: FAMILY MEDICINE CLINIC | Age: 84
End: 2019-05-21
Payer: COMMERCIAL

## 2019-05-21 VITALS
TEMPERATURE: 98 F | HEART RATE: 78 BPM | WEIGHT: 133 LBS | RESPIRATION RATE: 16 BRPM | BODY MASS INDEX: 24.33 KG/M2 | DIASTOLIC BLOOD PRESSURE: 68 MMHG | SYSTOLIC BLOOD PRESSURE: 110 MMHG | OXYGEN SATURATION: 97 %

## 2019-05-21 DIAGNOSIS — L89.153 PRESSURE INJURY OF SACRAL REGION, STAGE 3 (HCC): ICD-10-CM

## 2019-05-21 DIAGNOSIS — G30.1 LATE ONSET ALZHEIMER'S DISEASE WITHOUT BEHAVIORAL DISTURBANCE (HCC): ICD-10-CM

## 2019-05-21 DIAGNOSIS — F02.80 LATE ONSET ALZHEIMER'S DISEASE WITHOUT BEHAVIORAL DISTURBANCE (HCC): ICD-10-CM

## 2019-05-21 DIAGNOSIS — F02.818 DEMENTIA ASSOCIATED WITH OTHER UNDERLYING DISEASE WITH BEHAVIORAL DISTURBANCE: Primary | ICD-10-CM

## 2019-05-21 PROCEDURE — 99214 OFFICE O/P EST MOD 30 MIN: CPT | Performed by: NURSE PRACTITIONER

## 2019-05-21 ASSESSMENT — PATIENT HEALTH QUESTIONNAIRE - PHQ9
SUM OF ALL RESPONSES TO PHQ QUESTIONS 1-9: 1
SUM OF ALL RESPONSES TO PHQ QUESTIONS 1-9: 1
SUM OF ALL RESPONSES TO PHQ9 QUESTIONS 1 & 2: 1
1. LITTLE INTEREST OR PLEASURE IN DOING THINGS: 0
2. FEELING DOWN, DEPRESSED OR HOPELESS: 1

## 2019-05-21 ASSESSMENT — ENCOUNTER SYMPTOMS: RESPIRATORY NEGATIVE: 1

## 2019-05-21 NOTE — PATIENT INSTRUCTIONS
Thank you for enrolling in [address] Please follow the instructions below to securely access your online medical record. [employer] allows you to send messages to your doctor, view your test results, renew your prescriptions, schedule appointments, and more. How Do I Sign Up? 1. In your Internet browser, go to [email or web address]  2. Click on the Sign Up Now link in the Sign In box. You will see the New Member Sign Up page. 3. Enter your [employer] Access Code exactly as it appears below. You will not need to use this code after youve completed the sign-up process. If you do not sign up before the expiration date, you must request a new code. [employer] Access Code: Activation code not generated  Current [facility] Status: Active    4. Enter your Social Security Number ([id number]) and Date of Birth (mm/dd/yyyy) as indicated and click Submit. You will be taken to the next sign-up page. 5. Create a [facility] ID. This will be your [employer] login ID and cannot be changed, so think of one that is secure and easy to remember. 6. Create a [facility] password. You can change your password at any time. 7. Enter your Password Reset Question and Answer. This can be used at a later time if you forget your password. 8. Enter your e-mail address. You will receive e-mail notification when new information is available in [address] 9. Click Sign Up. You can now view your medical record. Additional Information  If you have questions, please contact your physician practice where you receive care. Remember, [employer] is NOT to be used for urgent needs. For medical emergencies, dial 911.

## 2019-05-21 NOTE — PROGRESS NOTES
Subjective:      Chief Complaint   Patient presents with    Annual Exam     for nursing home admission       Patient ID: Nava Squires is a 80 y.o. female who presents for physical prior to nursing home. She has healing bed sore on coccyx. care should be topical cortAid cream alternate w A&D oint 2-3 times a day Family has been caring for her and the bed sores are much improved. Noted she is anemic but she was dx w anemia w/out evidence of active bleeding. She should be watched carefully. She is accompanied by her daughter in law to be who has power of . HPI as above    No family history on file. Social History     Socioeconomic History    Marital status:       Spouse name: Not on file    Number of children: Not on file    Years of education: Not on file    Highest education level: Not on file   Occupational History    Not on file   Social Needs    Financial resource strain: Not on file    Food insecurity:     Worry: Not on file     Inability: Not on file    Transportation needs:     Medical: Not on file     Non-medical: Not on file   Tobacco Use    Smoking status: Never Smoker    Smokeless tobacco: Never Used   Substance and Sexual Activity    Alcohol use: No     Alcohol/week: 0.0 oz    Drug use: No    Sexual activity: Not Currently   Lifestyle    Physical activity:     Days per week: Not on file     Minutes per session: Not on file    Stress: Not on file   Relationships    Social connections:     Talks on phone: Not on file     Gets together: Not on file     Attends [employer] service: Not on file     Active member of club or organization: Not on file     Attends meetings of clubs or organizations: Not on file     Relationship status: Not on file    Intimate partner violence:     Fear of current or ex partner: Not on file     Emotionally abused: Not on file     Physically abused: Not on file     Forced sexual activity: Not on file   Other Topics Concern    Not on file Social History Narrative    Not on file       Current Outpatient Medications on File Prior to Visit   Medication Sig Dispense Refill    carvedilol (COREG) 3.125 MG tablet Take 1 tablet by mouth 2 times daily 180 tablet 1    omeprazole (PRILOSEC) 20 MG delayed release capsule TAKE 1 CAPSULE BY MOUTH DAILY FOR STOMACH ACID 30 capsule 6    olmesartan (BENICAR) 20 MG tablet Take 1 tablet by mouth daily 30 tablet 5    citalopram (CELEXA) 10 MG tablet TAKE 1 TABLET BY MOUTH DAILY 30 tablet 5    simvastatin (ZOCOR) 40 MG tablet TAKE ONE TABLET BY MOUTH AT BEDTIME 30 tablet 5    spironolactone (ALDACTONE) 25 MG tablet Take 1 tablet by mouth daily 90 tablet 1    Acetaminophen (ARTHRITIS PAIN RELIEF PO) Take by mouth      zoster recombinant adjuvanted vaccine (SHINGRIX) 50 MCG SUSR injection 50 MCG IM then repeat 2-6 months. 0.5 mL 1    montelukast (SINGULAIR) 10 MG tablet Take 1 tablet by mouth nightly For sinus / chest 30 tablet 5    memantine ER (NAMENDA XR) 28 MG CP24 extended release capsule TAKE 1 CAPSULE BY MOUTH DAILY 30 capsule 11    Multiple Vitamins-Minerals (VITABASIC COMPLETE PO) Take by mouth      Simethicone (GAS RELIEF 125 MAX ST PO) Take by mouth daily      NITROSTAT 0.4 MG SL tablet PLACE 1 TABLET UNDER TONGUE EVERY 5 MINUTES AS NEEDED FOR CHEST PAIN 25 tablet 3    calcium carbonate (OSCAL) 500 MG TABS tablet Take 1,000 mg by mouth 2 times daily.  Cholecalciferol (VITAMIN D3) 1000 UNITS CAPS Take 1 capsule by mouth daily.  aspirin 81 MG tablet Take 81 mg by mouth daily. No current facility-administered medications on file prior to visit. Review of Systems   Respiratory: Negative. Cardiovascular: Positive for chest pain (treated with nitroglycerin sublingually when necessary).         Hyperlipidemia total from 2018 was 100 HDL 37 LDL 50 triglycerides 106 treated with Zocor    CHF treated with Coreg and spironolactone   Gastrointestinal:        GERD treated with a PPI

## 2025-01-17 NOTE — PROGRESS NOTES
visits:  1). Supine to/from sit: CGA  2). Sit to/from stand: CGA  3). Bed to chair: CGA  4). Gait x 50' with RW and min A at most  5). Tolerate B LE exercises 10 reps    Rehabilitation Potential   Good for goals listed above. Strengths for achieving goals include: PLOF, cooperation with therapy, family support  Barriers to achieving goals include: weakness, poor balance    Plan    To be seen 5x/week while in acute care setting for therapeutic exercises, bed mobility, transfers, progressive gait training, balance training, and family/patient education.      Timed Code Treatment Minutes:  20 minutes  Total Treatment Time:   40 minutes    Suad Baron, PT, DPT #[id number] 26-Jan-2025